# Patient Record
Sex: FEMALE | URBAN - METROPOLITAN AREA
[De-identification: names, ages, dates, MRNs, and addresses within clinical notes are randomized per-mention and may not be internally consistent; named-entity substitution may affect disease eponyms.]

---

## 2018-01-12 ENCOUNTER — APPOINTMENT (RX ONLY)
Dept: URBAN - METROPOLITAN AREA CLINIC 158 | Facility: CLINIC | Age: 33
Setting detail: DERMATOLOGY
End: 2018-01-12

## 2018-01-12 DIAGNOSIS — L72.8 OTHER FOLLICULAR CYSTS OF THE SKIN AND SUBCUTANEOUS TISSUE: ICD-10-CM

## 2018-01-12 DIAGNOSIS — B07.8 OTHER VIRAL WARTS: ICD-10-CM

## 2018-01-12 PROBLEM — L70.0 ACNE VULGARIS: Status: ACTIVE | Noted: 2018-01-12

## 2018-01-12 PROCEDURE — 17110 DESTRUCTION B9 LES UP TO 14: CPT

## 2018-01-12 PROCEDURE — 99242 OFF/OP CONSLTJ NEW/EST SF 20: CPT | Mod: 25

## 2018-01-12 PROCEDURE — ? LIQUID NITROGEN

## 2018-01-12 PROCEDURE — ? TREATMENT REGIMEN

## 2018-01-12 PROCEDURE — ? COUNSELING

## 2018-01-12 PROCEDURE — ? PRESCRIPTION

## 2018-01-12 RX ORDER — TAZAROTENE 1 MG/G
APPLY CREAM CUTANEOUS QHS
Qty: 30 | Refills: 5 | Status: ERX | COMMUNITY
Start: 2018-01-12

## 2018-01-12 RX ADMIN — TAZAROTENE APPLY: 1 CREAM CUTANEOUS at 13:34

## 2018-01-12 ASSESSMENT — LOCATION ZONE DERM
LOCATION ZONE: FACE
LOCATION ZONE: FINGER

## 2018-01-12 ASSESSMENT — LOCATION SIMPLE DESCRIPTION DERM
LOCATION SIMPLE: LEFT FOREHEAD
LOCATION SIMPLE: LEFT THUMB
LOCATION SIMPLE: CHIN
LOCATION SIMPLE: RIGHT CHEEK

## 2018-01-12 ASSESSMENT — LOCATION DETAILED DESCRIPTION DERM
LOCATION DETAILED: RIGHT CHIN
LOCATION DETAILED: PERIUNGUAL SKIN LEFT THUMB
LOCATION DETAILED: LEFT INFERIOR MEDIAL FOREHEAD
LOCATION DETAILED: RIGHT MEDIAL BUCCAL CHEEK

## 2018-01-12 NOTE — PROCEDURE: LIQUID NITROGEN
Medical Necessity Information: It is in your best interest to select a reason for this procedure from the list below. All of these items fulfill various CMS LCD requirements except the new and changing color options.
Number Of Freeze-Thaw Cycles: 2 freeze-thaw cycles
Consent: The patient's consent was obtained including but not limited to risks of crusting, scabbing, blistering, scarring, darker or lighter pigmentary change, recurrence, incomplete removal and infection.
Add 52 Modifier (Optional): no
Post-Care Instructions: I reviewed with the patient in detail post-care instructions. Patient is to wear sunprotection, and avoid picking at any of the treated lesions. Pt may apply Vaseline to crusted or scabbing areas.
Render Post-Care Instructions In Note?: yes
Medical Necessity Clause: This procedure was medically necessary because the lesions that were treated were:
Detail Level: Detailed

## 2018-01-12 NOTE — PROCEDURE: TREATMENT REGIMEN
Detail Level: Zone
Samples Given: Tazorac copay card
Plan: Discussed ILK injections if not improved with Tazorac 0.1% cream
Plan: Discussed imiquimod cream cream if wart doesnt clear with Ln2 tx
Initiate Treatment: Tazorac 0.1% cream at bedtime

## 2021-06-21 ENCOUNTER — OFFICE VISIT (OUTPATIENT)
Dept: FAMILY MEDICINE | Facility: CLINIC | Age: 36
End: 2021-06-21
Payer: OTHER GOVERNMENT

## 2021-06-21 ENCOUNTER — LAB VISIT (OUTPATIENT)
Dept: LAB | Facility: HOSPITAL | Age: 36
End: 2021-06-21
Attending: INTERNAL MEDICINE
Payer: OTHER GOVERNMENT

## 2021-06-21 VITALS
DIASTOLIC BLOOD PRESSURE: 80 MMHG | RESPIRATION RATE: 16 BRPM | OXYGEN SATURATION: 97 % | HEIGHT: 66 IN | TEMPERATURE: 98 F | BODY MASS INDEX: 25.47 KG/M2 | SYSTOLIC BLOOD PRESSURE: 118 MMHG | WEIGHT: 158.5 LBS | HEART RATE: 79 BPM

## 2021-06-21 DIAGNOSIS — Z87.442 HISTORY OF KIDNEY STONES: ICD-10-CM

## 2021-06-21 DIAGNOSIS — Z86.19 H/O HERPES LABIALIS: ICD-10-CM

## 2021-06-21 DIAGNOSIS — Z00.00 ENCOUNTER FOR ANNUAL PHYSICAL EXAM: Primary | ICD-10-CM

## 2021-06-21 DIAGNOSIS — Z00.00 ENCOUNTER FOR ANNUAL PHYSICAL EXAM: ICD-10-CM

## 2021-06-21 PROBLEM — N20.0 KIDNEY STONES: Status: ACTIVE | Noted: 2021-06-21

## 2021-06-21 LAB
ALBUMIN SERPL BCP-MCNC: 3.5 G/DL (ref 3.5–5.2)
ALP SERPL-CCNC: 32 U/L (ref 55–135)
ALT SERPL W/O P-5'-P-CCNC: 16 U/L (ref 10–44)
ANION GAP SERPL CALC-SCNC: 8 MMOL/L (ref 8–16)
AST SERPL-CCNC: 18 U/L (ref 10–40)
BASOPHILS # BLD AUTO: 0.03 K/UL (ref 0–0.2)
BASOPHILS NFR BLD: 0.4 % (ref 0–1.9)
BILIRUB SERPL-MCNC: 0.4 MG/DL (ref 0.1–1)
BUN SERPL-MCNC: 8 MG/DL (ref 6–20)
CALCIUM SERPL-MCNC: 9.8 MG/DL (ref 8.7–10.5)
CHLORIDE SERPL-SCNC: 108 MMOL/L (ref 95–110)
CHOLEST SERPL-MCNC: 181 MG/DL (ref 120–199)
CHOLEST/HDLC SERPL: 3.9 {RATIO} (ref 2–5)
CO2 SERPL-SCNC: 24 MMOL/L (ref 23–29)
CREAT SERPL-MCNC: 0.8 MG/DL (ref 0.5–1.4)
DIFFERENTIAL METHOD: NORMAL
EOSINOPHIL # BLD AUTO: 0.1 K/UL (ref 0–0.5)
EOSINOPHIL NFR BLD: 0.9 % (ref 0–8)
ERYTHROCYTE [DISTWIDTH] IN BLOOD BY AUTOMATED COUNT: 14.3 % (ref 11.5–14.5)
EST. GFR  (AFRICAN AMERICAN): >60 ML/MIN/1.73 M^2
EST. GFR  (NON AFRICAN AMERICAN): >60 ML/MIN/1.73 M^2
ESTIMATED AVG GLUCOSE: 100 MG/DL (ref 68–131)
GLUCOSE SERPL-MCNC: 86 MG/DL (ref 70–110)
HBA1C MFR BLD: 5.1 % (ref 4–5.6)
HCT VFR BLD AUTO: 37.4 % (ref 37–48.5)
HDLC SERPL-MCNC: 47 MG/DL (ref 40–75)
HDLC SERPL: 26 % (ref 20–50)
HGB BLD-MCNC: 12 G/DL (ref 12–16)
IMM GRANULOCYTES # BLD AUTO: 0.01 K/UL (ref 0–0.04)
IMM GRANULOCYTES NFR BLD AUTO: 0.1 % (ref 0–0.5)
LDLC SERPL CALC-MCNC: 114.8 MG/DL (ref 63–159)
LYMPHOCYTES # BLD AUTO: 2 K/UL (ref 1–4.8)
LYMPHOCYTES NFR BLD: 30 % (ref 18–48)
MCH RBC QN AUTO: 27.5 PG (ref 27–31)
MCHC RBC AUTO-ENTMCNC: 32.1 G/DL (ref 32–36)
MCV RBC AUTO: 86 FL (ref 82–98)
MONOCYTES # BLD AUTO: 0.5 K/UL (ref 0.3–1)
MONOCYTES NFR BLD: 7.8 % (ref 4–15)
NEUTROPHILS # BLD AUTO: 4.1 K/UL (ref 1.8–7.7)
NEUTROPHILS NFR BLD: 60.8 % (ref 38–73)
NONHDLC SERPL-MCNC: 134 MG/DL
NRBC BLD-RTO: 0 /100 WBC
PLATELET # BLD AUTO: 364 K/UL (ref 150–450)
PMV BLD AUTO: 11.3 FL (ref 9.2–12.9)
POTASSIUM SERPL-SCNC: 5 MMOL/L (ref 3.5–5.1)
PROT SERPL-MCNC: 7.3 G/DL (ref 6–8.4)
RBC # BLD AUTO: 4.37 M/UL (ref 4–5.4)
SODIUM SERPL-SCNC: 140 MMOL/L (ref 136–145)
TRIGL SERPL-MCNC: 96 MG/DL (ref 30–150)
TSH SERPL DL<=0.005 MIU/L-ACNC: 1.43 UIU/ML (ref 0.4–4)
WBC # BLD AUTO: 6.76 K/UL (ref 3.9–12.7)

## 2021-06-21 PROCEDURE — 99385 PR PREVENTIVE VISIT,NEW,18-39: ICD-10-PCS | Mod: S$PBB,,, | Performed by: INTERNAL MEDICINE

## 2021-06-21 PROCEDURE — 85025 COMPLETE CBC W/AUTO DIFF WBC: CPT | Performed by: INTERNAL MEDICINE

## 2021-06-21 PROCEDURE — 83036 HEMOGLOBIN GLYCOSYLATED A1C: CPT | Performed by: INTERNAL MEDICINE

## 2021-06-21 PROCEDURE — 80053 COMPREHEN METABOLIC PANEL: CPT | Performed by: INTERNAL MEDICINE

## 2021-06-21 PROCEDURE — 99205 OFFICE O/P NEW HI 60 MIN: CPT | Mod: PBBFAC,PO | Performed by: INTERNAL MEDICINE

## 2021-06-21 PROCEDURE — 99999 PR PBB SHADOW E&M-NEW PATIENT-LVL V: CPT | Mod: PBBFAC,,, | Performed by: INTERNAL MEDICINE

## 2021-06-21 PROCEDURE — 36415 COLL VENOUS BLD VENIPUNCTURE: CPT | Mod: PO | Performed by: INTERNAL MEDICINE

## 2021-06-21 PROCEDURE — 80061 LIPID PANEL: CPT | Performed by: INTERNAL MEDICINE

## 2021-06-21 PROCEDURE — 84443 ASSAY THYROID STIM HORMONE: CPT | Performed by: INTERNAL MEDICINE

## 2021-06-21 PROCEDURE — 99999 PR PBB SHADOW E&M-NEW PATIENT-LVL V: ICD-10-PCS | Mod: PBBFAC,,, | Performed by: INTERNAL MEDICINE

## 2021-06-21 PROCEDURE — 99385 PREV VISIT NEW AGE 18-39: CPT | Mod: S$PBB,,, | Performed by: INTERNAL MEDICINE

## 2021-06-21 RX ORDER — NORGESTIMATE AND ETHINYL ESTRADIOL 7DAYSX3 LO
1 KIT ORAL DAILY
COMMUNITY
Start: 2021-06-07 | End: 2021-08-16 | Stop reason: SDUPTHER

## 2021-06-21 RX ORDER — POTASSIUM CITRATE 15 MEQ/1
TABLET, EXTENDED RELEASE ORAL DAILY
COMMUNITY
End: 2021-06-25 | Stop reason: SDUPTHER

## 2021-06-21 RX ORDER — VALACYCLOVIR HYDROCHLORIDE 500 MG/1
TABLET, FILM COATED ORAL
COMMUNITY
End: 2021-10-04 | Stop reason: SDUPTHER

## 2021-06-21 RX ORDER — NORGESTIMATE AND ETHINYL ESTRADIOL 7DAYSX3 LO
KIT ORAL DAILY
COMMUNITY
End: 2021-08-16

## 2021-06-23 ENCOUNTER — TELEPHONE (OUTPATIENT)
Dept: FAMILY MEDICINE | Facility: CLINIC | Age: 36
End: 2021-06-23

## 2021-06-25 ENCOUNTER — OFFICE VISIT (OUTPATIENT)
Dept: UROLOGY | Facility: CLINIC | Age: 36
End: 2021-06-25
Payer: OTHER GOVERNMENT

## 2021-06-25 VITALS — HEIGHT: 66 IN | BODY MASS INDEX: 25.38 KG/M2 | WEIGHT: 157.94 LBS

## 2021-06-25 DIAGNOSIS — Z87.442 HISTORY OF KIDNEY STONES: ICD-10-CM

## 2021-06-25 DIAGNOSIS — N39.3 SUI (STRESS URINARY INCONTINENCE, FEMALE): Primary | ICD-10-CM

## 2021-06-25 DIAGNOSIS — N22 CALCULUS OF URINARY TRACT IN DISEASES CLASSIFIED ELSEWHERE: ICD-10-CM

## 2021-06-25 PROCEDURE — 99214 OFFICE O/P EST MOD 30 MIN: CPT | Mod: PBBFAC | Performed by: STUDENT IN AN ORGANIZED HEALTH CARE EDUCATION/TRAINING PROGRAM

## 2021-06-25 PROCEDURE — 99999 PR PBB SHADOW E&M-EST. PATIENT-LVL IV: ICD-10-PCS | Mod: PBBFAC,,, | Performed by: STUDENT IN AN ORGANIZED HEALTH CARE EDUCATION/TRAINING PROGRAM

## 2021-06-25 PROCEDURE — 99243 OFF/OP CNSLTJ NEW/EST LOW 30: CPT | Mod: S$PBB,,, | Performed by: STUDENT IN AN ORGANIZED HEALTH CARE EDUCATION/TRAINING PROGRAM

## 2021-06-25 PROCEDURE — 99243 PR OFFICE CONSULTATION,LEVEL III: ICD-10-PCS | Mod: S$PBB,,, | Performed by: STUDENT IN AN ORGANIZED HEALTH CARE EDUCATION/TRAINING PROGRAM

## 2021-06-25 PROCEDURE — 99999 PR PBB SHADOW E&M-EST. PATIENT-LVL IV: CPT | Mod: PBBFAC,,, | Performed by: STUDENT IN AN ORGANIZED HEALTH CARE EDUCATION/TRAINING PROGRAM

## 2021-06-25 RX ORDER — POTASSIUM CITRATE 15 MEQ/1
1 TABLET, EXTENDED RELEASE ORAL DAILY
Qty: 356 TABLET | Refills: 1 | Status: SHIPPED | OUTPATIENT
Start: 2021-06-25 | End: 2022-09-21 | Stop reason: SDUPTHER

## 2021-07-06 ENCOUNTER — PATIENT MESSAGE (OUTPATIENT)
Dept: ADMINISTRATIVE | Facility: HOSPITAL | Age: 36
End: 2021-07-06

## 2021-07-08 ENCOUNTER — OFFICE VISIT (OUTPATIENT)
Dept: URGENT CARE | Facility: CLINIC | Age: 36
End: 2021-07-08
Payer: OTHER GOVERNMENT

## 2021-07-08 VITALS
HEIGHT: 66 IN | DIASTOLIC BLOOD PRESSURE: 87 MMHG | WEIGHT: 157 LBS | RESPIRATION RATE: 18 BRPM | HEART RATE: 87 BPM | SYSTOLIC BLOOD PRESSURE: 120 MMHG | BODY MASS INDEX: 25.23 KG/M2 | OXYGEN SATURATION: 98 % | TEMPERATURE: 99 F

## 2021-07-08 DIAGNOSIS — B96.89 ACUTE BACTERIAL SINUSITIS: Primary | ICD-10-CM

## 2021-07-08 DIAGNOSIS — R05.9 COUGH: ICD-10-CM

## 2021-07-08 DIAGNOSIS — J01.90 ACUTE BACTERIAL SINUSITIS: Primary | ICD-10-CM

## 2021-07-08 LAB
CTP QC/QA: YES
SARS-COV-2 RDRP RESP QL NAA+PROBE: NEGATIVE

## 2021-07-08 PROCEDURE — U0002 COVID-19 LAB TEST NON-CDC: HCPCS | Mod: QW,S$GLB,, | Performed by: PHYSICIAN ASSISTANT

## 2021-07-08 PROCEDURE — 99214 PR OFFICE/OUTPT VISIT, EST, LEVL IV, 30-39 MIN: ICD-10-PCS | Mod: S$GLB,,, | Performed by: PHYSICIAN ASSISTANT

## 2021-07-08 PROCEDURE — U0002: ICD-10-PCS | Mod: QW,S$GLB,, | Performed by: PHYSICIAN ASSISTANT

## 2021-07-08 PROCEDURE — 99214 OFFICE O/P EST MOD 30 MIN: CPT | Mod: S$GLB,,, | Performed by: PHYSICIAN ASSISTANT

## 2021-07-08 RX ORDER — METHYLPREDNISOLONE 4 MG/1
TABLET ORAL
Qty: 1 PACKAGE | Refills: 0 | Status: SHIPPED | OUTPATIENT
Start: 2021-07-08 | End: 2021-08-16

## 2021-07-08 RX ORDER — BENZONATATE 100 MG/1
200 CAPSULE ORAL 3 TIMES DAILY PRN
Qty: 40 CAPSULE | Refills: 0 | Status: SHIPPED | OUTPATIENT
Start: 2021-07-08 | End: 2021-07-15

## 2021-07-08 RX ORDER — AMOXICILLIN AND CLAVULANATE POTASSIUM 875; 125 MG/1; MG/1
1 TABLET, FILM COATED ORAL 2 TIMES DAILY
Qty: 20 TABLET | Refills: 0 | Status: SHIPPED | OUTPATIENT
Start: 2021-07-08 | End: 2021-07-18

## 2021-07-09 ENCOUNTER — HOSPITAL ENCOUNTER (OUTPATIENT)
Dept: RADIOLOGY | Facility: HOSPITAL | Age: 36
Discharge: HOME | End: 2021-07-09
Attending: STUDENT IN AN ORGANIZED HEALTH CARE EDUCATION/TRAINING PROGRAM
Payer: OTHER GOVERNMENT

## 2021-07-09 DIAGNOSIS — N22 CALCULUS OF URINARY TRACT IN DISEASES CLASSIFIED ELSEWHERE: ICD-10-CM

## 2021-07-09 DIAGNOSIS — Z87.442 HISTORY OF KIDNEY STONES: ICD-10-CM

## 2021-07-09 PROCEDURE — 74176 CT ABD & PELVIS W/O CONTRAST: CPT | Mod: 26,,, | Performed by: RADIOLOGY

## 2021-07-09 PROCEDURE — 74176 CT ABD & PELVIS W/O CONTRAST: CPT | Mod: TC

## 2021-07-09 PROCEDURE — 74176 CT RENAL STONE STUDY ABD PELVIS WO: ICD-10-PCS | Mod: 26,,, | Performed by: RADIOLOGY

## 2021-07-12 ENCOUNTER — TELEPHONE (OUTPATIENT)
Dept: UROLOGY | Facility: CLINIC | Age: 36
End: 2021-07-12

## 2021-08-16 ENCOUNTER — OFFICE VISIT (OUTPATIENT)
Dept: OBSTETRICS AND GYNECOLOGY | Facility: CLINIC | Age: 36
End: 2021-08-16
Attending: OBSTETRICS & GYNECOLOGY
Payer: OTHER GOVERNMENT

## 2021-08-16 VITALS
BODY MASS INDEX: 23.91 KG/M2 | SYSTOLIC BLOOD PRESSURE: 120 MMHG | WEIGHT: 148.13 LBS | DIASTOLIC BLOOD PRESSURE: 60 MMHG

## 2021-08-16 DIAGNOSIS — Z30.41 ENCOUNTER FOR SURVEILLANCE OF CONTRACEPTIVE PILLS: Primary | ICD-10-CM

## 2021-08-16 DIAGNOSIS — K59.00 CONSTIPATION, UNSPECIFIED CONSTIPATION TYPE: ICD-10-CM

## 2021-08-16 PROCEDURE — 99202 PR OFFICE/OUTPT VISIT, NEW, LEVL II, 15-29 MIN: ICD-10-PCS | Mod: S$PBB,,, | Performed by: OBSTETRICS & GYNECOLOGY

## 2021-08-16 PROCEDURE — 99999 PR PBB SHADOW E&M-EST. PATIENT-LVL II: ICD-10-PCS | Mod: PBBFAC,,, | Performed by: OBSTETRICS & GYNECOLOGY

## 2021-08-16 PROCEDURE — 99212 OFFICE O/P EST SF 10 MIN: CPT | Mod: PBBFAC | Performed by: OBSTETRICS & GYNECOLOGY

## 2021-08-16 PROCEDURE — 99202 OFFICE O/P NEW SF 15 MIN: CPT | Mod: S$PBB,,, | Performed by: OBSTETRICS & GYNECOLOGY

## 2021-08-16 PROCEDURE — 99999 PR PBB SHADOW E&M-EST. PATIENT-LVL II: CPT | Mod: PBBFAC,,, | Performed by: OBSTETRICS & GYNECOLOGY

## 2021-08-16 RX ORDER — NORGESTIMATE AND ETHINYL ESTRADIOL 7DAYSX3 LO
1 KIT ORAL DAILY
Qty: 90 TABLET | Refills: 4 | Status: SHIPPED | OUTPATIENT
Start: 2021-08-16 | End: 2022-08-30

## 2021-10-04 ENCOUNTER — PATIENT MESSAGE (OUTPATIENT)
Dept: ADMINISTRATIVE | Facility: HOSPITAL | Age: 36
End: 2021-10-04

## 2021-10-04 ENCOUNTER — PATIENT MESSAGE (OUTPATIENT)
Dept: FAMILY MEDICINE | Facility: CLINIC | Age: 36
End: 2021-10-04

## 2021-10-04 RX ORDER — VALACYCLOVIR HYDROCHLORIDE 500 MG/1
TABLET, FILM COATED ORAL
Qty: 16 TABLET | Refills: 1 | Status: SHIPPED | OUTPATIENT
Start: 2021-10-04 | End: 2022-02-17 | Stop reason: SDUPTHER

## 2022-02-17 ENCOUNTER — TELEPHONE (OUTPATIENT)
Dept: FAMILY MEDICINE | Facility: CLINIC | Age: 37
End: 2022-02-17
Payer: OTHER GOVERNMENT

## 2022-02-17 ENCOUNTER — PATIENT MESSAGE (OUTPATIENT)
Dept: FAMILY MEDICINE | Facility: CLINIC | Age: 37
End: 2022-02-17
Payer: OTHER GOVERNMENT

## 2022-02-17 DIAGNOSIS — Z86.19 H/O HERPES LABIALIS: Primary | ICD-10-CM

## 2022-02-17 RX ORDER — VALACYCLOVIR HYDROCHLORIDE 500 MG/1
TABLET, FILM COATED ORAL
Qty: 16 TABLET | Refills: 1 | Status: SHIPPED | OUTPATIENT
Start: 2022-02-17 | End: 2022-10-06 | Stop reason: SDUPTHER

## 2022-02-17 NOTE — TELEPHONE ENCOUNTER
Left message for pt to return call to clinic to see if it okay to send prescription without having the virtual visit.

## 2022-05-16 ENCOUNTER — TELEPHONE (OUTPATIENT)
Dept: FAMILY MEDICINE | Facility: CLINIC | Age: 37
End: 2022-05-16
Payer: OTHER GOVERNMENT

## 2022-05-16 NOTE — TELEPHONE ENCOUNTER
----- Message from Emily Crystal sent at 5/16/2022  9:52 AM CDT -----  Regarding: self  .Type:  Needs Medical Advice    Who Called:  self   Symptoms (please be specific):  rash on face under nose, burning   How long has patient had these symptoms:   a while   Pharmacy name and phone #:       [facility] - [place], [place] - [address]  [address] [facility] [address] [address]  Phone: [phone] Fax: [phone]      Would the patient rather a call back or a response via [facility]? Call   Best Call Back Number: .[phone]

## 2022-05-17 ENCOUNTER — OFFICE VISIT (OUTPATIENT)
Dept: FAMILY MEDICINE | Facility: CLINIC | Age: 37
End: 2022-05-17
Payer: OTHER GOVERNMENT

## 2022-05-17 VITALS
TEMPERATURE: 99 F | HEART RATE: 73 BPM | SYSTOLIC BLOOD PRESSURE: 100 MMHG | OXYGEN SATURATION: 99 % | DIASTOLIC BLOOD PRESSURE: 86 MMHG | HEIGHT: 66 IN | WEIGHT: 133.5 LBS | BODY MASS INDEX: 21.45 KG/M2

## 2022-05-17 DIAGNOSIS — R21 RASH: Primary | ICD-10-CM

## 2022-05-17 PROCEDURE — 99999 PR PBB SHADOW E&M-EST. PATIENT-LVL IV: ICD-10-PCS | Mod: PBBFAC,,, | Performed by: NURSE PRACTITIONER

## 2022-05-17 PROCEDURE — 99213 OFFICE O/P EST LOW 20 MIN: CPT | Mod: S$PBB,,, | Performed by: NURSE PRACTITIONER

## 2022-05-17 PROCEDURE — 99213 PR OFFICE/OUTPT VISIT, EST, LEVL III, 20-29 MIN: ICD-10-PCS | Mod: S$PBB,,, | Performed by: NURSE PRACTITIONER

## 2022-05-17 PROCEDURE — 99214 OFFICE O/P EST MOD 30 MIN: CPT | Mod: PBBFAC,PO | Performed by: NURSE PRACTITIONER

## 2022-05-17 PROCEDURE — 99999 PR PBB SHADOW E&M-EST. PATIENT-LVL IV: CPT | Mod: PBBFAC,,, | Performed by: NURSE PRACTITIONER

## 2022-05-17 RX ORDER — TRIAMCINOLONE ACETONIDE 1 MG/G
CREAM TOPICAL 2 TIMES DAILY
Qty: 15 G | Refills: 0 | Status: SHIPPED | OUTPATIENT
Start: 2022-05-17 | End: 2022-09-21

## 2022-05-17 NOTE — PROGRESS NOTES
Chief Complaint  Chief Complaint   Patient presents with    Rash     Under nose, off and on since January. Goes away and flares back up. Rash burns, if nose is running and mucus touches it it hurts really bad.       HPI    HPI   Ms. Addis Cristobal is a 37 y.o. female with medical problems as listed below. The patient presents to clinic with c/o rash under her nose that comes and goes. The patient states that she has been having this problem since january. The patient states that the rash burns and gets worse when her nose runs.    The patient states that she has tried hydrocortisone, antifungal, Vaseline and has even taking valtrex and Abreva with no relief.     PAST MEDICAL HISTORY:  Past Medical History:   Diagnosis Date    Human papilloma virus (HPV) type 9 vaccine administered     Kidney stones        PAST SURGICAL HISTORY:  Past Surgical History:   Procedure Laterality Date     SECTION         SOCIAL HISTORY:  Social History     Socioeconomic History    Marital status:    Occupational History    Occupation: [employer]     Comment: planner, works at [employer]   Tobacco Use    Smoking status: Never Smoker    Smokeless tobacco: Never Used   Substance and Sexual Activity    Alcohol use: Yes     Alcohol/week: 1.0 standard drink     Types: 1 Glasses of wine per week     Comment: socially    Drug use: Never    Sexual activity: Yes     Partners: Male     Birth control/protection: OCP   Social History Narrative    [employer] son 7 months.    [employer].    Dairy allergy.       FAMILY HISTORY:  Family History   Problem Relation Age of Onset    Hypertension Mother     Coronary artery disease Mother         stent    Kidney disease Father         kidney stones    No Known Problems Sister     Breast cancer Neg Hx     Ovarian cancer Neg Hx     Colon cancer Neg Hx        ALLERGIES AND MEDICATIONS: updated and reviewed.  Review of patient's allergies indicates:   Allergen Reactions  "   Doxycycline Anaphylaxis     esopahgeal spasms     Current Outpatient Medications   Medication Sig Dispense Refill    potassium citrate (UROCIT-K 15) 15 mEq TbSR Use as directed 1 tablet in the mouth or throat once daily. 356 tablet 1    TRI-LO-SPRINTEC 0.18/0.215/0.25 mg-25 mcg tablet Take 1 tablet by mouth once daily. 90 tablet 4    valACYclovir (VALTREX) 500 MG tablet Take 2 pills twice a day if flare 16 tablet 1    triamcinolone acetonide 0.1% (KENALOG) 0.1 % cream Apply topically 2 (two) times daily. 15 g 0     No current facility-administered medications for this visit.       Patient Care Team:  Leigh Zepeda MD as PCP - General (Family Medicine)    ROS  Review of Systems   Constitutional: Negative for chills, fatigue, fever and unexpected weight change.   HENT: Negative for congestion, ear discharge, ear pain and postnasal drip.    Eyes: Negative for photophobia, pain and visual disturbance.   Respiratory: Negative for cough, shortness of breath and wheezing.    Cardiovascular: Negative for chest pain, palpitations and leg swelling.   Gastrointestinal: Negative for abdominal pain, constipation, diarrhea, nausea and vomiting.   Genitourinary: Negative for dysuria, frequency, urgency and vaginal discharge.   Musculoskeletal: Negative for back pain, joint swelling and neck stiffness.   Skin: Positive for rash.   Neurological: Negative for weakness and headaches.   Psychiatric/Behavioral: Negative for dysphoric mood and sleep disturbance. The patient is not nervous/anxious.            Physical Exam  Vitals:    05/17/22 0813   BP: 100/86   Pulse: 73   Temp: 98.7 °F (37.1 °C)   TempSrc: Oral   SpO2: 99%   Weight: 60.6 kg (133 lb 7.8 oz)   Height: 5' 6" (1.676 m)    Body mass index is 21.55 kg/m².  Weight: 60.6 kg (133 lb 7.8 oz)   Height: 5' 6" (167.6 cm)     Physical Exam  Constitutional:       Appearance: She is well-developed.   HENT:      Head: Normocephalic and atraumatic.      Right Ear: External " ear normal.      Left Ear: External ear normal.      Nose: Nose normal.   Eyes:      Conjunctiva/sclera: Conjunctivae normal.      Pupils: Pupils are equal, round, and reactive to light.   Neck:      Thyroid: No thyromegaly.   Cardiovascular:      Rate and Rhythm: Normal rate.   Pulmonary:      Effort: Pulmonary effort is normal.      Breath sounds: Normal breath sounds.   Abdominal:      Palpations: There is no hepatomegaly or splenomegaly.   Genitourinary:     Vagina: Normal.   Musculoskeletal:         General: Normal range of motion.      Cervical back: Normal range of motion and neck supple.   Skin:     General: Skin is warm and dry.      Findings: Rash present. Rash is scaling (erythema noted).          Neurological:      Mental Status: She is alert and oriented to person, place, and time.      Cranial Nerves: No cranial nerve deficit.   Psychiatric:         Behavior: Behavior normal.         Thought Content: Thought content normal.         Judgment: Judgment normal.         Health Maintenance       Date Due Completion Date    Hepatitis C Screening Never done ---    Cervical Cancer Screening Never done ---    TETANUS VACCINE Never done ---    COVID-19 Vaccine (2 - Pfizer series) 01/16/2022 12/26/2021    HIV Screening 06/21/2027 (Originally 5/7/2002) ---    Influenza Vaccine (Season Ended) 09/01/2022 ---      Health maintenance reviewed at this time.     Assessment & Plan  Rash  -     Ambulatory referral/consult to Dermatology; Future; Expected date: 05/24/2022  -     triamcinolone acetonide 0.1% (KENALOG) 0.1 % cream; Apply topically 2 (two) times daily.  Dispense: 15 g; Refill: 0           Follow-up: Follow up if symptoms worsen or fail to improve.

## 2022-05-30 ENCOUNTER — PATIENT MESSAGE (OUTPATIENT)
Dept: ADMINISTRATIVE | Facility: HOSPITAL | Age: 37
End: 2022-05-30
Payer: OTHER GOVERNMENT

## 2022-08-17 ENCOUNTER — PATIENT MESSAGE (OUTPATIENT)
Dept: FAMILY MEDICINE | Facility: CLINIC | Age: 37
End: 2022-08-17
Payer: OTHER GOVERNMENT

## 2022-08-17 DIAGNOSIS — Z01.419 WELL WOMAN EXAM: ICD-10-CM

## 2022-08-17 DIAGNOSIS — N20.0 KIDNEY STONES: ICD-10-CM

## 2022-08-24 NOTE — TELEPHONE ENCOUNTER
----- Message from Maura Hope sent at 8/24/2022 11:27 AM CDT -----  .Type: Patient Call Back    Who called: self    What is the request in detail:  need a referral for urologist and obgyn for , she has messaged over a week ago and needs this. PLEASE CALL    Can the clinic reply by [facility]?no    Would the patient rather a call back or a response via [facility]? call    Best call back number:.[phone] (home)

## 2022-09-21 ENCOUNTER — OFFICE VISIT (OUTPATIENT)
Dept: UROLOGY | Facility: CLINIC | Age: 37
End: 2022-09-21
Payer: OTHER GOVERNMENT

## 2022-09-21 ENCOUNTER — OFFICE VISIT (OUTPATIENT)
Dept: OBSTETRICS AND GYNECOLOGY | Facility: CLINIC | Age: 37
End: 2022-09-21
Attending: OBSTETRICS & GYNECOLOGY
Payer: OTHER GOVERNMENT

## 2022-09-21 VITALS — BODY MASS INDEX: 21.1 KG/M2 | WEIGHT: 130.75 LBS

## 2022-09-21 VITALS — SYSTOLIC BLOOD PRESSURE: 108 MMHG | BODY MASS INDEX: 20.74 KG/M2 | DIASTOLIC BLOOD PRESSURE: 80 MMHG | WEIGHT: 128.5 LBS

## 2022-09-21 DIAGNOSIS — Z87.442 HISTORY OF KIDNEY STONES: ICD-10-CM

## 2022-09-21 DIAGNOSIS — Z01.419 WELL WOMAN EXAM: ICD-10-CM

## 2022-09-21 DIAGNOSIS — N20.0 KIDNEY STONES: ICD-10-CM

## 2022-09-21 DIAGNOSIS — Z30.41 ENCOUNTER FOR SURVEILLANCE OF CONTRACEPTIVE PILLS: Primary | ICD-10-CM

## 2022-09-21 PROCEDURE — 99213 OFFICE O/P EST LOW 20 MIN: CPT | Mod: PBBFAC | Performed by: OBSTETRICS & GYNECOLOGY

## 2022-09-21 PROCEDURE — 99999 PR PBB SHADOW E&M-EST. PATIENT-LVL III: CPT | Mod: PBBFAC,,, | Performed by: OBSTETRICS & GYNECOLOGY

## 2022-09-21 PROCEDURE — 99395 PREV VISIT EST AGE 18-39: CPT | Mod: S$PBB,,, | Performed by: OBSTETRICS & GYNECOLOGY

## 2022-09-21 PROCEDURE — 99999 PR PBB SHADOW E&M-EST. PATIENT-LVL III: ICD-10-PCS | Mod: PBBFAC,,, | Performed by: OBSTETRICS & GYNECOLOGY

## 2022-09-21 PROCEDURE — 99999 PR PBB SHADOW E&M-EST. PATIENT-LVL IV: CPT | Mod: PBBFAC,,, | Performed by: STUDENT IN AN ORGANIZED HEALTH CARE EDUCATION/TRAINING PROGRAM

## 2022-09-21 PROCEDURE — 99214 OFFICE O/P EST MOD 30 MIN: CPT | Mod: PBBFAC,27 | Performed by: STUDENT IN AN ORGANIZED HEALTH CARE EDUCATION/TRAINING PROGRAM

## 2022-09-21 PROCEDURE — 99395 PR PREVENTIVE VISIT,EST,18-39: ICD-10-PCS | Mod: S$PBB,,, | Performed by: OBSTETRICS & GYNECOLOGY

## 2022-09-21 PROCEDURE — 99213 PR OFFICE/OUTPT VISIT, EST, LEVL III, 20-29 MIN: ICD-10-PCS | Mod: S$PBB,,, | Performed by: STUDENT IN AN ORGANIZED HEALTH CARE EDUCATION/TRAINING PROGRAM

## 2022-09-21 PROCEDURE — 99213 OFFICE O/P EST LOW 20 MIN: CPT | Mod: S$PBB,,, | Performed by: STUDENT IN AN ORGANIZED HEALTH CARE EDUCATION/TRAINING PROGRAM

## 2022-09-21 PROCEDURE — 99999 PR PBB SHADOW E&M-EST. PATIENT-LVL IV: ICD-10-PCS | Mod: PBBFAC,,, | Performed by: STUDENT IN AN ORGANIZED HEALTH CARE EDUCATION/TRAINING PROGRAM

## 2022-09-21 RX ORDER — POTASSIUM CITRATE 15 MEQ/1
1 TABLET, EXTENDED RELEASE ORAL DAILY
Qty: 356 TABLET | Refills: 1 | Status: SHIPPED | OUTPATIENT
Start: 2022-09-21 | End: 2023-08-21

## 2022-09-21 RX ORDER — NORGESTIMATE AND ETHINYL ESTRADIOL 7DAYSX3 LO
1 KIT ORAL DAILY
Qty: 90 TABLET | Refills: 3 | Status: SHIPPED | OUTPATIENT
Start: 2022-09-21 | End: 2023-08-21

## 2022-09-21 RX ORDER — PIMECROLIMUS 10 MG/G
CREAM TOPICAL
COMMUNITY
Start: 2022-09-19 | End: 2022-09-21

## 2022-09-21 NOTE — PROGRESS NOTES
Patient ID: Bety Cristobal is a 37 y.o. female.    Chief Complaint: Annual Exam      HPI- interval    37 y.o. who presents to the Urology clinic for f/u of nephrolithiasis. Last seen 1 year ago. Patient notes needing refill of urocitrate. Patient denies fevers, chills, nausea, vomiting, flank pain, dysuria. Drinks 64 oz of water daily. Ocassional glass of juice, 2 cups of coffee daily. Has 1 BM daily, firm, uses colace as needed. No hx of recurrent UTIs. Baby Jason is doing well and thriving. Has FH of stones.       Medically Necessary ROS documented in HPI    HPI      37 yo woman w/ hx of recurrent urolithiasis since . Patient has had 5-6 stone events, passed stone. Last stone passed in 2018. Patient has not required a procedure to clear stones to date. Patient has completed 24 hr urine studies in the past which revealed low citrate excretion. Patient has been drinking 4-6 glasses of water per day.    Father and aunt w/ hx of stones. Patient drinks soda/tea occasionally. Reports daily soft BM. Denies excess Vit C/d supplementation. Denies protein  Intake. She had a baby 2020, renal US at that time showed very small stones, imaging not available for personal review. Patient without renal colic or urinary urgency, frequency. Notes mild stress incontinence with delaying voiding.     Past Medical History  Active Ambulatory Problems     Diagnosis Date Noted    Kidney stones 2021    H/O herpes labialis- oral outbreaks only 2021     Resolved Ambulatory Problems     Diagnosis Date Noted    No Resolved Ambulatory Problems     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          Past Surgical History  Past Surgical History:   Procedure Laterality Date     SECTION         Social History  Social Connections: Not on file       Medications    Current Outpatient Medications:     norgestimate-ethinyl estradioL (TRI-LO-SPRINTEC)  0.18/0.215/0.25 mg-25 mcg tablet, Take 1 tablet by mouth once daily., Disp: 90 tablet, Rfl: 3    valACYclovir (VALTREX) 500 MG tablet, Take 2 pills twice a day if flare, Disp: 16 tablet, Rfl: 1    potassium citrate (UROCIT-K 15) 15 mEq TbSR, Use as directed 1 tablet in the mouth or throat once daily., Disp: 356 tablet, Rfl: 1    Allergies  Review of patient's allergies indicates:   Allergen Reactions    Doxycycline Anaphylaxis     esopahgeal spasms       Patient's PMH, FH, Social hx, Medications, allergies reviewed and updated as pertinent to today's visit    Objective:      Physical Exam  Constitutional:       Appearance: She is well-developed.   HENT:      Head: Normocephalic and atraumatic.   Eyes:      Conjunctiva/sclera: Conjunctivae normal.   Pulmonary:      Effort: Pulmonary effort is normal. No respiratory distress.   Abdominal:      General: Abdomen is flat. There is no distension.      Palpations: Abdomen is soft. There is no mass.      Tenderness: There is no abdominal tenderness. There is no right CVA tenderness, left CVA tenderness or guarding.   Skin:     General: Skin is warm.      Findings: No rash.   Neurological:      Mental Status: She is alert and oriented to person, place, and time.   Psychiatric:         Behavior: Behavior normal.             Assessment:       1. Kidney stones    2. History of kidney stones          Plan:       Nephrolithiasis  Very small punctate stones on CT from 7/2021  Stone former's diet reviewed  Urocit Rx refilled  SUZIE/KUB ordered for surveillance    RTC 1 year or sooner if symptomatic

## 2022-09-21 NOTE — PROGRESS NOTES
CC: Well woman exam    Bety Cristobal is a 37 y.o. female  presents for well woman exam.  LMP: Patient's last menstrual period was 2022..  No issues, problems, or complaints.  Pap 3/2021. Son is turning 2.  Doing well with OCPs. Seeing urology for history of kidney stones.  Occasionally using colace as needed.      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     Kidney stones      Past Surgical History:   Procedure Laterality Date     SECTION       Social History     Socioeconomic History    Marital status:     Number of children: 1   Occupational History    Occupation: [employer]     Comment: planner, works at [employer]   Tobacco Use    Smoking status: Never    Smokeless tobacco: Never   Substance and Sexual Activity    Alcohol use: Yes     Alcohol/week: 1.0 standard drink     Types: 1 Glasses of wine per week     Comment: socially    Drug use: Never    Sexual activity: Yes     Partners: Male     Birth control/protection: OCP   Social History Narrative    [employer] son 7 months.    [employer].    Dairy allergy.     Family History   Problem Relation Age of Onset    Hypertension Mother     Coronary artery disease Mother         stent    Kidney disease Father         kidney stones    No Known Problems Sister     Breast cancer Neg Hx     Ovarian cancer Neg Hx     Colon cancer Neg Hx      OB History          1    Para   1    Term   1            AB        Living   1         SAB        IAB        Ectopic        Multiple        Live Births   1                 /80   Wt 58.3 kg (128 lb 8.5 oz)   LMP 2022   Breastfeeding No   BMI 20.74 kg/m²       ROS:  GENERAL: Denies weight gain or weight loss. Feeling well overall.   SKIN: Denies rash or lesions.   HEAD: Denies head injury or headache.   NODES: Denies enlarged lymph nodes.   CHEST: Denies chest pain or shortness of breath.   CARDIOVASCULAR: Denies  palpitations or left sided chest pain.   ABDOMEN: No abdominal pain, constipation, diarrhea, nausea, vomiting or rectal bleeding.   URINARY: No frequency, dysuria, hematuria, or burning on urination.  REPRODUCTIVE: See HPI.   BREASTS: The patient performs breast self-examination and denies pain, lumps, or nipple discharge.   HEMATOLOGIC: No easy bruisability or excessive bleeding.   MUSCULOSKELETAL: Denies joint pain or swelling.   NEUROLOGIC: Denies syncope or weakness.   PSYCHIATRIC: Denies depression, anxiety or mood swings.    PHYSICAL EXAM:  APPEARANCE: Well nourished, well developed, in no acute distress.  AFFECT: WNL, alert and oriented x 3  SKIN: No acne or hirsutism  NECK: Neck symmetric without masses or thyromegaly  NODES: No inguinal, cervical, axillary, or femoral lymph node enlargement  CHEST: Good respiratory effect  ABDOMEN: Soft.  No tenderness or masses.  No hepatosplenomegaly.  No hernias.  BREASTS: Symmetrical, no skin changes or visible lesions.  No palpable masses, nipple discharge bilaterally.  PELVIC: Normal external genitalia without lesions.  Normal hair distribution.  Adequate perineal body, normal urethral meatus.  Vagina moist and well rugated without lesions or discharge.  Cervix pink, without lesions, discharge or tenderness.  No significant cystocele or rectocele.  Bimanual exam shows uterus to be normal size, regular, mobile and nontender.  Adnexa without masses or tenderness.    EXTREMITIES: No edema.    Encounter for surveillance of contraceptive pills  -     norgestimate-ethinyl estradioL (TRI-LO-SPRINTEC) 0.18/0.215/0.25 mg-25 mcg tablet; Take 1 tablet by mouth once daily.  Dispense: 90 tablet; Refill: 3    Well woman exam  -     Ambulatory referral/consult to Obstetrics / Gynecology          Patient was counseled today on A.C.S. Pap guidelines and recommendations for yearly pelvic exams, mammograms and monthly self breast exams; to see her PCP for other health maintenance.      No follow-ups on file.

## 2022-10-05 ENCOUNTER — HOSPITAL ENCOUNTER (OUTPATIENT)
Dept: RADIOLOGY | Facility: HOSPITAL | Age: 37
Discharge: HOME | End: 2022-10-05
Attending: STUDENT IN AN ORGANIZED HEALTH CARE EDUCATION/TRAINING PROGRAM
Payer: OTHER GOVERNMENT

## 2022-10-05 DIAGNOSIS — Z87.442 HISTORY OF KIDNEY STONES: ICD-10-CM

## 2022-10-05 DIAGNOSIS — N20.0 KIDNEY STONES: ICD-10-CM

## 2022-10-05 PROCEDURE — 74018 RADEX ABDOMEN 1 VIEW: CPT | Mod: 26,,, | Performed by: RADIOLOGY

## 2022-10-05 PROCEDURE — 76770 US EXAM ABDO BACK WALL COMP: CPT | Mod: 26,,, | Performed by: RADIOLOGY

## 2022-10-05 PROCEDURE — 74018 XR ABDOMEN AP 1 VIEW: ICD-10-PCS | Mod: 26,,, | Performed by: RADIOLOGY

## 2022-10-05 PROCEDURE — 76770 US EXAM ABDO BACK WALL COMP: CPT | Mod: TC

## 2022-10-05 PROCEDURE — 76770 US RETROPERITONEAL COMPLETE: ICD-10-PCS | Mod: 26,,, | Performed by: RADIOLOGY

## 2022-10-05 PROCEDURE — 74018 RADEX ABDOMEN 1 VIEW: CPT | Mod: TC,FY

## 2022-10-06 ENCOUNTER — LAB VISIT (OUTPATIENT)
Dept: LAB | Facility: HOSPITAL | Age: 37
End: 2022-10-06
Attending: INTERNAL MEDICINE
Payer: OTHER GOVERNMENT

## 2022-10-06 ENCOUNTER — TELEPHONE (OUTPATIENT)
Dept: UROLOGY | Facility: CLINIC | Age: 37
End: 2022-10-06
Payer: OTHER GOVERNMENT

## 2022-10-06 ENCOUNTER — OFFICE VISIT (OUTPATIENT)
Dept: FAMILY MEDICINE | Facility: CLINIC | Age: 37
End: 2022-10-06
Payer: OTHER GOVERNMENT

## 2022-10-06 VITALS
TEMPERATURE: 99 F | WEIGHT: 129.63 LBS | DIASTOLIC BLOOD PRESSURE: 74 MMHG | RESPIRATION RATE: 16 BRPM | SYSTOLIC BLOOD PRESSURE: 120 MMHG | HEIGHT: 66 IN | OXYGEN SATURATION: 97 % | BODY MASS INDEX: 20.83 KG/M2 | HEART RATE: 83 BPM

## 2022-10-06 DIAGNOSIS — Z86.19 H/O HERPES LABIALIS: ICD-10-CM

## 2022-10-06 DIAGNOSIS — Z00.00 ENCOUNTER FOR ANNUAL PHYSICAL EXAM: ICD-10-CM

## 2022-10-06 DIAGNOSIS — L71.0 PERIORAL DERMATITIS: ICD-10-CM

## 2022-10-06 DIAGNOSIS — Z00.00 ENCOUNTER FOR ANNUAL PHYSICAL EXAM: Primary | ICD-10-CM

## 2022-10-06 DIAGNOSIS — Z87.442 HISTORY OF KIDNEY STONES: ICD-10-CM

## 2022-10-06 LAB
ALBUMIN SERPL BCP-MCNC: 3.7 G/DL (ref 3.5–5.2)
ALP SERPL-CCNC: 28 U/L (ref 55–135)
ALT SERPL W/O P-5'-P-CCNC: 16 U/L (ref 10–44)
ANION GAP SERPL CALC-SCNC: 11 MMOL/L (ref 8–16)
AST SERPL-CCNC: 20 U/L (ref 10–40)
BASOPHILS # BLD AUTO: 0.04 K/UL (ref 0–0.2)
BASOPHILS NFR BLD: 0.5 % (ref 0–1.9)
BILIRUB SERPL-MCNC: 0.4 MG/DL (ref 0.1–1)
BUN SERPL-MCNC: 10 MG/DL (ref 6–20)
CALCIUM SERPL-MCNC: 9.7 MG/DL (ref 8.7–10.5)
CHLORIDE SERPL-SCNC: 105 MMOL/L (ref 95–110)
CHOLEST SERPL-MCNC: 160 MG/DL (ref 120–199)
CHOLEST/HDLC SERPL: 2.8 {RATIO} (ref 2–5)
CO2 SERPL-SCNC: 22 MMOL/L (ref 23–29)
CREAT SERPL-MCNC: 0.8 MG/DL (ref 0.5–1.4)
DIFFERENTIAL METHOD: ABNORMAL
EOSINOPHIL # BLD AUTO: 0.1 K/UL (ref 0–0.5)
EOSINOPHIL NFR BLD: 1 % (ref 0–8)
ERYTHROCYTE [DISTWIDTH] IN BLOOD BY AUTOMATED COUNT: 13.3 % (ref 11.5–14.5)
EST. GFR  (NO RACE VARIABLE): >60 ML/MIN/1.73 M^2
ESTIMATED AVG GLUCOSE: 97 MG/DL (ref 68–131)
GLUCOSE SERPL-MCNC: 75 MG/DL (ref 70–110)
HBA1C MFR BLD: 5 % (ref 4–5.6)
HCT VFR BLD AUTO: 40.7 % (ref 37–48.5)
HCV AB SERPL QL IA: NORMAL
HDLC SERPL-MCNC: 57 MG/DL (ref 40–75)
HDLC SERPL: 35.6 % (ref 20–50)
HGB BLD-MCNC: 12.9 G/DL (ref 12–16)
IMM GRANULOCYTES # BLD AUTO: 0.02 K/UL (ref 0–0.04)
IMM GRANULOCYTES NFR BLD AUTO: 0.2 % (ref 0–0.5)
LDLC SERPL CALC-MCNC: 76.8 MG/DL (ref 63–159)
LYMPHOCYTES # BLD AUTO: 2.4 K/UL (ref 1–4.8)
LYMPHOCYTES NFR BLD: 28.8 % (ref 18–48)
MCH RBC QN AUTO: 28.7 PG (ref 27–31)
MCHC RBC AUTO-ENTMCNC: 31.7 G/DL (ref 32–36)
MCV RBC AUTO: 91 FL (ref 82–98)
MONOCYTES # BLD AUTO: 0.4 K/UL (ref 0.3–1)
MONOCYTES NFR BLD: 5.3 % (ref 4–15)
NEUTROPHILS # BLD AUTO: 5.3 K/UL (ref 1.8–7.7)
NEUTROPHILS NFR BLD: 64.2 % (ref 38–73)
NONHDLC SERPL-MCNC: 103 MG/DL
NRBC BLD-RTO: 0 /100 WBC
PLATELET # BLD AUTO: 290 K/UL (ref 150–450)
PMV BLD AUTO: 11.1 FL (ref 9.2–12.9)
POTASSIUM SERPL-SCNC: 4.4 MMOL/L (ref 3.5–5.1)
PROT SERPL-MCNC: 7 G/DL (ref 6–8.4)
RBC # BLD AUTO: 4.49 M/UL (ref 4–5.4)
SODIUM SERPL-SCNC: 138 MMOL/L (ref 136–145)
TRIGL SERPL-MCNC: 131 MG/DL (ref 30–150)
TSH SERPL DL<=0.005 MIU/L-ACNC: 1.23 UIU/ML (ref 0.4–4)
WBC # BLD AUTO: 8.23 K/UL (ref 3.9–12.7)

## 2022-10-06 PROCEDURE — 80061 LIPID PANEL: CPT | Performed by: INTERNAL MEDICINE

## 2022-10-06 PROCEDURE — 83036 HEMOGLOBIN GLYCOSYLATED A1C: CPT | Performed by: INTERNAL MEDICINE

## 2022-10-06 PROCEDURE — 99395 PR PREVENTIVE VISIT,EST,18-39: ICD-10-PCS | Mod: S$PBB,,, | Performed by: INTERNAL MEDICINE

## 2022-10-06 PROCEDURE — 86803 HEPATITIS C AB TEST: CPT | Performed by: INTERNAL MEDICINE

## 2022-10-06 PROCEDURE — 99999 PR PBB SHADOW E&M-EST. PATIENT-LVL III: ICD-10-PCS | Mod: PBBFAC,,, | Performed by: INTERNAL MEDICINE

## 2022-10-06 PROCEDURE — 99999 PR PBB SHADOW E&M-EST. PATIENT-LVL III: CPT | Mod: PBBFAC,,, | Performed by: INTERNAL MEDICINE

## 2022-10-06 PROCEDURE — 85025 COMPLETE CBC W/AUTO DIFF WBC: CPT | Performed by: INTERNAL MEDICINE

## 2022-10-06 PROCEDURE — 84443 ASSAY THYROID STIM HORMONE: CPT | Performed by: INTERNAL MEDICINE

## 2022-10-06 PROCEDURE — 99395 PREV VISIT EST AGE 18-39: CPT | Mod: S$PBB,,, | Performed by: INTERNAL MEDICINE

## 2022-10-06 PROCEDURE — 80053 COMPREHEN METABOLIC PANEL: CPT | Performed by: INTERNAL MEDICINE

## 2022-10-06 PROCEDURE — 99213 OFFICE O/P EST LOW 20 MIN: CPT | Mod: PBBFAC,PO | Performed by: INTERNAL MEDICINE

## 2022-10-06 PROCEDURE — 36415 COLL VENOUS BLD VENIPUNCTURE: CPT | Mod: PO | Performed by: INTERNAL MEDICINE

## 2022-10-06 RX ORDER — TRIAMCINOLONE ACETONIDE 1 MG/G
CREAM TOPICAL
COMMUNITY
Start: 2022-05-17 | End: 2023-08-21

## 2022-10-06 RX ORDER — VALACYCLOVIR HYDROCHLORIDE 500 MG/1
TABLET, FILM COATED ORAL
Qty: 16 TABLET | Refills: 1 | Status: ON HOLD | OUTPATIENT
Start: 2022-10-06 | End: 2024-03-22 | Stop reason: HOSPADM

## 2022-10-06 NOTE — PROGRESS NOTES
I hereby acknowledge that I am relying upon documentation authored by a medical student working under my supervision and further I hereby attest that I have verified the student documentation or findings by personally re-performing the physical exam and medical decision making activities of the Evaluation and Management service to be billed.\    Leigh Zepeda    Chief Complaint  Chief Complaint   Patient presents with    Annual Exam       HPI  Bety Cristobal is a 37 y.o. female with multiple medical diagnoses as listed in the medical history and problem list that presents for annual exam.  Their last appointment with primary care was Visit date not found.      Patient reports she has been feeling well and has no acute complaints. She recently followed up with her urologist regarding her kidney stones and states condition is managed with with potassium citrate and good hydration. She has not had a symptomatic kidney stone episode since 2018. She also recently followed up with her OBGYN and has no acute issues. She is on OCPs and is with the same male partner. She states her labial herpes flare-ups occur every few months and are well managed with Valtrex PRN. She was recently diagnosed with perioral dermatitis by her dermatologist but states this has since resolved with treatment.     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 She stays active by playing with and taking care of her 2 year old son. She states she has a balanced diet and other good lifestyle. She denies any recent stressors or any kind and feels happy and well.     Patient denies all other symptoms.     PAST MEDICAL HISTORY:  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     Kidney stones        PAST SURGICAL HISTORY:  Past Surgical History:   Procedure Laterality Date     SECTION         SOCIAL HISTORY:  Social History     Socioeconomic History     Marital status:     Number of children: 1   Occupational History    Occupation:      Comment: planner, works at [employer]   Tobacco Use    Smoking status: Never    Smokeless tobacco: Never   Substance and Sexual Activity    Alcohol use: Yes     Alcohol/week: 1.0 standard drink     Types: 1 Glasses of wine per week     Comment: socially    Drug use: Never    Sexual activity: Yes     Partners: Male     Birth control/protection: OCP   Social History Narrative    [employer] son 7 months.    [employer].    Dairy allergy.       FAMILY HISTORY:  Family History   Problem Relation Age of Onset    Hypertension Mother     Coronary artery disease Mother         stent    Kidney disease Father         kidney stones    No Known Problems Sister     Breast cancer Neg Hx     Ovarian cancer Neg Hx     Colon cancer Neg Hx        ALLERGIES AND MEDICATIONS: updated and reviewed.  Review of patient's allergies indicates:   Allergen Reactions    Doxycycline Anaphylaxis     esopahgeal spasms     Current Outpatient Medications   Medication Sig Dispense Refill    COVID-19 vac, sb,Pfizer,,PF, (PFIZER COVID-19) 30 mcg/0.3 mL injection       norgestimate-ethinyl estradioL (TRI-LO-SPRINTEC) 0.18/0.215/0.25 mg-25 mcg tablet Take 1 tablet by mouth once daily. 90 tablet 3    potassium citrate (UROCIT-K 15) 15 mEq TbSR Use as directed 1 tablet in the mouth or throat once daily. 356 tablet 1    triamcinolone acetonide 0.1% (KENALOG) 0.1 % cream       valACYclovir (VALTREX) 500 MG tablet Take 2 pills twice a day if flare 16 tablet 1     No current facility-administered medications for this visit.         ROS  Review of Systems   Constitutional: Negative.    HENT: Negative.     Eyes: Negative.    Respiratory: Negative.     Cardiovascular: Negative.    Gastrointestinal: Negative.    Endocrine: Negative.    Genitourinary: Negative.    Musculoskeletal: Negative.    Allergic/Immunologic: Negative.    Neurological: Negative.   "  Hematological: Negative.    Psychiatric/Behavioral: Negative.           Physical Exam  Vitals:    10/06/22 0919   BP: 120/74   BP Location: Left arm   Patient Position: Sitting   BP Method: Large (Manual)   Pulse: 83   Resp: 16   Temp: 98.9 °F (37.2 °C)   TempSrc: Oral   SpO2: 97%   Weight: 58.8 kg (129 lb 10.1 oz)   Height: 5' 6" (1.676 m)    Body mass index is 20.92 kg/m².  Weight: 58.8 kg (129 lb 10.1 oz)   Height: 5' 6" (167.6 cm)     Physical Exam  Constitutional:       Appearance: Normal appearance. She is normal weight.   HENT:      Head: Normocephalic and atraumatic.      Mouth/Throat:      Mouth: Mucous membranes are moist.      Pharynx: Oropharynx is clear.   Eyes:      Extraocular Movements: Extraocular movements intact.      Conjunctiva/sclera: Conjunctivae normal.      Pupils: Pupils are equal, round, and reactive to light.   Cardiovascular:      Rate and Rhythm: Normal rate and regular rhythm.      Pulses: Normal pulses.      Heart sounds: Normal heart sounds.   Pulmonary:      Effort: Pulmonary effort is normal.      Breath sounds: Normal breath sounds.   Abdominal:      General: Abdomen is flat.      Palpations: Abdomen is soft.   Musculoskeletal:         General: Normal range of motion.   Skin:     General: Skin is warm and dry.      Capillary Refill: Capillary refill takes less than 2 seconds.   Neurological:      General: No focal deficit present.      Mental Status: She is alert and oriented to person, place, and time.   Psychiatric:         Mood and Affect: Mood normal.         Behavior: Behavior normal.         Health Maintenance         Date Due Completion Date    Hepatitis C Screening Never done ---    Cervical Cancer Screening Never done ---    TETANUS VACCINE Never done ---    COVID-19 Vaccine (2 - Pfizer series) 01/16/2022 12/26/2021    Influenza Vaccine (1) Never done ---    HIV Screening 06/21/2027 (Originally 5/7/2002) ---              Assessment and Plan:  Bety was seen today for " annual exam.    Diagnoses and all orders for this visit:    Encounter for annual physical exam  -     CBC Auto Differential; Future  -     Comprehensive Metabolic Panel; Future  -     Hemoglobin A1C; Future  -     TSH; Future  -     Lipid Panel; Future  -     Hepatitis C Antibody; Future    Will do usual blood work, discussed preventative measures as per HPI. Discussed lifestyle that is excellent. Non smoker.    Herpes labialis  Patient has flare-ups every few months. Condition well managed with Valtrex PRN. No medication changes needed at this time.     History of kidney stones  Patient followed up with Urologist Dr. Marroquin on 9/21. U/S done yesterday showed Ri mid renal pole simple cyst and nonobstructive stone. Patient asx since 2018 and taking potassium citrate and drinking plenty of fluids.   Condition managed well. Urology following.     Perioral dermatitis  Patient seen by dermatologist who prescribed Kenalog cream and issue has since resolved. No other medications needed at this time.

## 2022-10-06 NOTE — TELEPHONE ENCOUNTER
"The pt was informed and verbalized understanding.    ----- Message from Vaishali Marroquin MD sent at 10/6/2022  7:17 AM CDT -----  Notified patient via portal, please alert patient. "Reviewed imaging, a very small stone was noted in your R kidney. This is similar to your CT scan from last year. Please continue your current regimen. Follow up in 1 year ( sooner if noted to have suspicious pain in the right kidney area). All the best"  "

## 2022-10-06 NOTE — PROGRESS NOTES
"Notified patient via portal, please alert patient. "Reviewed imaging, a very small stone was noted in your R kidney. This is similar to your CT scan from last year. Please continue your current regimen. Follow up in 1 year ( sooner if noted to have suspicious pain in the right kidney area). All the best""

## 2022-10-06 NOTE — PROGRESS NOTES
Health Maintenance Due   Topic     Hepatitis C Screening      Cervical Cancer Screening      TETANUS VACCINE      COVID-19 Vaccine (2 - Pfizer series)     Influenza Vaccine (1)

## 2022-10-10 ENCOUNTER — PATIENT MESSAGE (OUTPATIENT)
Dept: ADMINISTRATIVE | Facility: HOSPITAL | Age: 37
End: 2022-10-10
Payer: OTHER GOVERNMENT

## 2023-01-09 ENCOUNTER — PATIENT MESSAGE (OUTPATIENT)
Dept: ADMINISTRATIVE | Facility: HOSPITAL | Age: 38
End: 2023-01-09
Payer: OTHER GOVERNMENT

## 2023-03-29 ENCOUNTER — OFFICE VISIT (OUTPATIENT)
Dept: FAMILY MEDICINE | Facility: CLINIC | Age: 38
End: 2023-03-29
Payer: OTHER GOVERNMENT

## 2023-03-29 VITALS
DIASTOLIC BLOOD PRESSURE: 68 MMHG | TEMPERATURE: 98 F | HEART RATE: 90 BPM | SYSTOLIC BLOOD PRESSURE: 120 MMHG | HEIGHT: 66 IN | WEIGHT: 128.75 LBS | BODY MASS INDEX: 20.69 KG/M2 | RESPIRATION RATE: 16 BRPM | OXYGEN SATURATION: 98 %

## 2023-03-29 DIAGNOSIS — M25.519 SHOULDER PAIN, UNSPECIFIED CHRONICITY, UNSPECIFIED LATERALITY: Primary | ICD-10-CM

## 2023-03-29 DIAGNOSIS — Z87.442 HISTORY OF KIDNEY STONES: ICD-10-CM

## 2023-03-29 DIAGNOSIS — Z86.19 H/O HERPES LABIALIS: ICD-10-CM

## 2023-03-29 PROCEDURE — 99214 OFFICE O/P EST MOD 30 MIN: CPT | Mod: PBBFAC,PO | Performed by: INTERNAL MEDICINE

## 2023-03-29 PROCEDURE — 99999 PR PBB SHADOW E&M-EST. PATIENT-LVL IV: ICD-10-PCS | Mod: PBBFAC,,, | Performed by: INTERNAL MEDICINE

## 2023-03-29 PROCEDURE — 99214 OFFICE O/P EST MOD 30 MIN: CPT | Mod: S$PBB,,, | Performed by: INTERNAL MEDICINE

## 2023-03-29 PROCEDURE — 99214 PR OFFICE/OUTPT VISIT, EST, LEVL IV, 30-39 MIN: ICD-10-PCS | Mod: S$PBB,,, | Performed by: INTERNAL MEDICINE

## 2023-03-29 PROCEDURE — 99999 PR PBB SHADOW E&M-EST. PATIENT-LVL IV: CPT | Mod: PBBFAC,,, | Performed by: INTERNAL MEDICINE

## 2023-03-29 RX ORDER — MELOXICAM 15 MG/1
15 TABLET ORAL DAILY PRN
Qty: 30 TABLET | Refills: 1 | Status: SHIPPED | OUTPATIENT
Start: 2023-03-29 | End: 2023-06-06

## 2023-03-29 NOTE — PROGRESS NOTES
Subjective:       Patient ID: Bety Cristobal is a pleasant 37 y.o. White female patient    Chief Complaint: Pain (Left shoulder)      Patient is a pt I saw last on 10/06/2022 for an annual physical exam.    HPI     Pt with PMH as per list of problems below who comes today due to pain of L shoulder.  Reports that it has been present for months, fluctuating, no initial trauma.  She has a 2 year old she has to carry sometimes. 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  Pain is worse as night when she sleeps on L side, or if on R side if she tends to turn her L arm on the R.  She has no pain today.  She may take ibuprofen that helps.    Patient Active Problem List   Diagnosis    Kidney stones    H/O herpes labialis- oral outbreaks only    Shoulder pain          ACTIVE MEDICAL ISSUES:  Documented in Problem List     PAST MEDICAL HISTORY  Documented     PAST SURGICAL HISTORY:  Documented     SOCIAL HISTORY:  Documented     FAMILY HISTORY:  Documented     ALLERGIES AND MEDICATIONS: updated and reviewed.  Documented    Review of Systems   Constitutional: Negative.    HENT: Negative.     Respiratory: Negative.     Cardiovascular: Negative.    Musculoskeletal:  Positive for arthralgias (L shoulder).   All other systems reviewed and are negative.    Objective:      Physical Exam  Vitals and nursing note reviewed.   Constitutional:       Appearance: Normal appearance. She is normal weight.   Cardiovascular:      Rate and Rhythm: Normal rate and regular rhythm.   Pulmonary:      Effort: Pulmonary effort is normal.      Breath sounds: Normal breath sounds.   Musculoskeletal:      Right shoulder: Normal.      Left shoulder: No swelling or deformity. Normal range of motion.        Arms:    Neurological:      Mental Status: She is alert.       Vitals:    03/29/23 0951   BP: 120/68   BP Location: Right arm   Patient Position: Sitting   BP Method: Large (Manual)   Pulse: 90   Resp:  "16   Temp: 98.1 °F (36.7 °C)   TempSrc: Oral   SpO2: 98%   Weight: 58.4 kg (128 lb 12 oz)   Height: 5' 6" (1.676 m)     Body mass index is 20.78 kg/m².    RESULTS: Reviewed labs from last 12 months    Last Lab Results:     Lab Results   Component Value Date    WBC 8.23 10/06/2022    HGB 12.9 10/06/2022    HCT 40.7 10/06/2022     10/06/2022     10/06/2022    K 4.4 10/06/2022     10/06/2022    CO2 22 (L) 10/06/2022    BUN 10 10/06/2022    CREATININE 0.8 10/06/2022    CALCIUM 9.7 10/06/2022    ALBUMIN 3.7 10/06/2022    AST 20 10/06/2022    ALT 16 10/06/2022    CHOL 160 10/06/2022    TRIG 131 10/06/2022    HDL 57 10/06/2022    LDLCALC 76.8 10/06/2022    HGBA1C 5.0 10/06/2022    TSH 1.232 10/06/2022     Assessment:       1. Shoulder pain, unspecified chronicity, unspecified laterality    2. H/O herpes labialis    3. History of kidney stones          Plan:   Bety was seen today for pain.    Diagnoses and all orders for this visit:    Shoulder pain, unspecified chronicity, unspecified laterality  -     meloxicam (MOBIC) 15 MG tablet; Take 1 tablet (15 mg total) by mouth daily as needed for Pain.  -     Ambulatory referral/consult to Physical/Occupational Therapy; Future    See HPI. ROM fine. Seems to be MSK or tendonitis. Will give meloxicam with all usual recommendations for safe use. Referral to PT. If not better, will refer to Orthopedics.    H/O herpes labialis    Not an issue today.    History of kidney stones    Not an issue today.    No follow-ups on file.    This note was created by combination of typed  and M-Modal dictation.  Transcription errors may be present.  If there are any questions, please contact me.  "

## 2023-03-29 NOTE — PROGRESS NOTES
Health Maintenance Due   Topic     Cervical Cancer Screening      TETANUS VACCINE      COVID-19 Vaccine (2 - Pfizer series)     Influenza Vaccine (1)

## 2023-04-12 ENCOUNTER — PATIENT MESSAGE (OUTPATIENT)
Dept: ADMINISTRATIVE | Facility: HOSPITAL | Age: 38
End: 2023-04-12
Payer: OTHER GOVERNMENT

## 2023-04-17 NOTE — PROGRESS NOTES
"[facility]  Physical Therapy Initial Evaluation    Name: Bety Cristobal  Clinic Number: [id number]    Therapy Diagnosis:   Encounter Diagnosis   Name Primary?    Chronic shoulder pain, unspecified laterality Yes     Physician: Leigh Zepeda MD    Physician Orders: Physical Therapy Evaluate and Treat  Medical Diagnosis from Referral: Shoulder pain, unspecified chronicity, unspecified laterality [M25.519]  Evaluation Date: 2023  Authorization Period Expiration: 8/3/2023  Plan of Care Expiration: 2023 to 2023  Visit # / Visits authorized:  (pending additional authorization following initial evaluation)     Time In: 10:00 am    Time Out: 10:50 am  Total Billable Time: 50 minutes    Precautions:  None    Subjective     Date of onset: insidious, a few months ago     History of current condition - Bety reports: experiencing pain in L posterior shoulder. Patient reports the pain is "deep" within the musculature. Pt reports her pain does not affect her everyday life, but she is having issues sleeping. Patient's doctor has prescribed pain medication for pain/sleep disturbances. Patient reports non-compliance with prescription. 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 Patient states using R arm more due to pain.        Medical History:   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     Kidney stones        Surgical History:   Bety Cristobal  has a past surgical history that includes  section.    Medications:   Bety has a current medication list which includes the following prescription(s): covid-19 vac, sb(pfizer)(pf), meloxicam, norgestimate-ethinyl estradiol, potassium citrate, triamcinolone acetonide 0.1%, and valacyclovir.    Allergies:   Review of patient's allergies indicates:   Allergen Reactions    Doxycycline " Anaphylaxis     esopahgeal spasms        Imaging: None     Prior Therapy: N/A  Social History:  lives with their family  Occupation: wedding dress advisor    Prior Level of Function: patient able to perform ADLS and work duties   Current Level of Function: patient unable to perform work duties without experience L posterior shoulder pain    Pain:  Current 2/10, worst 8/10, best 0/10   Location: left shoulder   Description: Aching and Throbbing  Aggravating Factors: Night Time (sleeping on side) and Lifting  Easing Factors: pain medication and rest    Pts goals: Pt would like to return to work duties with no increase in L shoulder pain.    Objective     WNL=within normal limits  WFL=within functional limits  NT=not tested  *=pain    Posture: patient presents with a forward head posture and rounded shoulders   Palpation: Pain reported with palpation to greater tubercle of humerus   Sensation: TBA  Deep tendon reflexes: TBA        Cervical ROM:  Cervical Right   Flexion NT   Extension NT   Right rotation NT   Left rotation NT       Cervical Cluster:  Spurling's NT NT   Distraction NT NT   ULTT A NT NT   Ipsilateral Rotation <60 degrees NT NT       Active Range of Motion:   Shoulder Right Left   Flexion 171 180   Abduction 180 160   ER  78 70   IR 90 80   Relieves with IN: forward head posture rounded shoulder     Strength:  Shoulder Right Left   Flexion 4/5 4/5   ER 4/5 4-/5   IR 4-/5 4-/5   Upper trap 4/5 4/5   Middle Trapezius 4+/5 4/5   Lower Trapezius TBA TBA   Serratus Anterior TBA TBA   Rhomboids TBA TBA       Special Tests:   Right Left   AC Joint Compression Test NT NT   Painful arc (--) (+)   Drop Arm test (--) (--)   Subscaputlaris Lift Off NT NT   Clunk test NT NT   O'yovani's test NT NT   Hawkin's Kenndy (--) (--)     Dynamometer Measurements: (taken standing with elbow at 90 degrees flexion):  Right: TBA lbs  Left: TBA lbs      CMS Impairment/Limitation/Restriction for FOTO Survey    Therapist reviewed  "FOTO scores for Bety Cristobal on 4/19/2023.   FOTO documents entered into [employer] - see Media section.    Limitation Score: 26%  Predicted Goal: 23%    Category: Mobility     TREATMENT     Treatment Time In: 10:00 am   Treatment Time Out: 10:50 am   Total Treatment time separate from Evaluation: 15 minutes    Therapeutic Exercises were provided for 15 minutes to improve strength and AROM including:  L Prone Y, T's 2x10 #0  Seated scapular squeeze with pillow in lap to support UE's 10x5" hold  Open Books w/ Foam Roller R/L 5x5 hold    Manual Therapy was provided for 0 minutes for improved joint mobility including:      Home Exercises and Patient Education Provided:    Education provided:   - Findings; prognosis and plan of care (POC)  - Home exercise program (HEP)  - Modality options  - Therapist contact information    Written Home Exercises Provided: Yes  Exercises were reviewed and Bety was able to demonstrate them prior to the end of the session.  Bety demonstrated good understanding of the education provided.     See EMR under Patient Instructions for exercises provided today.    Assessment     Bety is a 37 y.o. female referred to outpatient Physical Therapy with a medical diagnosis of Shoulder pain, unspecified chronicity, unspecified laterality [M25.519]. Pt presents to PT with pain, decreased shoulder ROM, decreased strength and flexibility, poor posture, and functional deficits with lifting and carry more than 50 lbs. These deficits are negatively impacting this patient's ability to complete their work duties and activities of daily living.     Pt prognosis is Good.   Pt will benefit from skilled outpatient Physical Therapy to address the deficits stated above and in the chart below, provide pt/family education, and to maximize pt's level of independence.     Plan of care discussed with patient: Yes  Pt's spiritual, cultural and educational needs considered and pt agreeable to plan of care and " goals as stated below:     Anticipated Barriers for therapy: None    Medical Necessity is demonstrated by the following  History  Co-morbidities and personal factors that may impact the plan of care Co-morbidities:   young age    Personal Factors:   age  lifestyle  character     low   Examination  Body Structures and Functions, activity limitations and participation restrictions that may impact the plan of care Body Regions:   upper extremities(shoulder)    Body Systems:    ROM  strength    Participation Restrictions:   Walking    Activity limitations:   Learning and applying knowledge  no deficits    General Tasks and Commands  No Deficits    Communication  No Deficits    Mobility  lifting and carrying objects    Self care  washing oneself (bathing, drying, washing hands)  caring for body parts (brushing teeth, shaving, grooming)    Domestic Life  No Deficits    Interactions/Relationships  No Deficits    Life Areas  No Deficits    Community and Social Life  No Deficits         low   Clinical Presentation stable and uncomplicated low   Decision Making/ Complexity Score: low     GOALS:  Short Term Goals (4 Weeks):   1. Patient will be compliant with home exercise program to assist therapy in restoring pain free motion of the shoulder. (Progressing, not met)  2. Patient will improve impaired shoulder manual muscle tests 1/2 grade bilaterally to improve strength for functional tasks.(Progressing, not met)  3. Patient will improve left shoulder flexion >/= 20 degrees to improve functional mobility of upper extremities.(Progressing, not met)  4. Patient will improve left shoulder abduction >/= 20 degrees to improve functional mobility of upper extremities. (Progressing, not met)     Long Term Goals (8 Weeks):   1. Patient will improve FOTO score to </= 26% to demonstrate improvements in lifting, carrying, moving, and handling objects (Progressing, not met)  2. Patient will improve impaired shoulder manual muscle tests 1  grade bilaterally to improve strength for household duties. (Progressing, not met)  3. Patient will improve left shoulder flexion to >/= 160 degrees to improve functional mobility of upper extremities. (Progressing, not met)  4. Patient will improve left shoulder abduction to >/= 160 degrees to improve functional mobility of upper extremities. (Progressing, not met)  5. Patient will be able to lift 5 boxes overhead without increase to L shoulder pain (Progressing, not met)  6. Patient will be able to carry 25 lb child with L arm without increase to L shoulder pain  (Progressing, not met)       Plan     Plan of care Certification: 4/19/2023 to 6/19/2023.    Outpatient Physical Therapy 2 times weekly for 4 weeks to include the following interventions: Therapeutic Exercises, Manual Therapeutic Technique, Neuromuscular Re Education, Therapeutic Activities. Modalities, Kinesiotape prn, and Functional Dry Needling as needed.    Pt was co-treated by KASSIE Warren under the direct supervision of a Licensed Physical Therapist   Jeanne Abel, PT,  DPT, OCS

## 2023-04-19 ENCOUNTER — CLINICAL SUPPORT (OUTPATIENT)
Dept: REHABILITATION | Facility: OTHER | Age: 38
End: 2023-04-19
Attending: INTERNAL MEDICINE
Payer: OTHER GOVERNMENT

## 2023-04-19 DIAGNOSIS — G89.29 CHRONIC SHOULDER PAIN, UNSPECIFIED LATERALITY: Primary | ICD-10-CM

## 2023-04-19 DIAGNOSIS — M25.519 SHOULDER PAIN, UNSPECIFIED CHRONICITY, UNSPECIFIED LATERALITY: ICD-10-CM

## 2023-04-19 DIAGNOSIS — M25.519 CHRONIC SHOULDER PAIN, UNSPECIFIED LATERALITY: Primary | ICD-10-CM

## 2023-04-19 PROCEDURE — 97530 THERAPEUTIC ACTIVITIES: CPT | Mod: PN

## 2023-04-19 PROCEDURE — 97161 PT EVAL LOW COMPLEX 20 MIN: CPT | Mod: PN

## 2023-04-19 NOTE — PLAN OF CARE
"[facility]  Physical Therapy Initial Evaluation    Name: Bety Cristobal  Clinic Number: [id number]    Therapy Diagnosis:   Encounter Diagnosis   Name Primary?    Chronic shoulder pain, unspecified laterality Yes     Physician: Leigh Zepeda MD    Physician Orders: Physical Therapy Evaluate and Treat  Medical Diagnosis from Referral: Shoulder pain, unspecified chronicity, unspecified laterality [M25.519]  Evaluation Date: 2023  Authorization Period Expiration: 8/3/2023  Plan of Care Expiration: 2023 to 2023  Visit # / Visits authorized:  (pending additional authorization following initial evaluation)     Time In: 10:00 am    Time Out: 10:50 am  Total Billable Time: 50 minutes    Precautions: None    Subjective     Date of onset: insidious, a few months ago     History of current condition - Bety reports: experiencing pain in L posterior shoulder. Patient reports the pain is "deep" within the musculature. Pt reports her pain does not affect her everyday life, but she is having issues sleeping. Patient's doctor has prescribed pain medication for pain/sleep disturbances. Patient reports non-compliance with prescription. 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 Patient states using R arm more due to pain.        Medical History:   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     Kidney stones        Surgical History:   Bety Cristobal  has a past surgical history that includes  section.    Medications:   Bety has a current medication list which includes the following prescription(s): covid-19 vac, sb(pfizer)(pf), meloxicam, norgestimate-ethinyl estradiol, potassium citrate, triamcinolone acetonide 0.1%, and valacyclovir.    Allergies:   Review of patient's allergies indicates:   Allergen Reactions    Doxycycline " Anaphylaxis     esopahgeal spasms        Imaging: None     Prior Therapy: N/A  Social History:  lives with their family  Occupation: wedding dress advisor    Prior Level of Function: patient able to perform ADLS and work duties   Current Level of Function: patient unable to perform work duties without experience L posterior shoulder pain    Pain:  Current 2/10, worst 8/10, best 0/10   Location: left shoulder   Description: Aching and Throbbing  Aggravating Factors: Night Time (sleeping on side) and Lifting  Easing Factors: pain medication and rest    Pts goals: Pt would like to return to work duties with no increase in L shoulder pain.    Objective     WNL=within normal limits  WFL=within functional limits  NT=not tested  *=pain    Posture: patient presents with a forward head posture and rounded shoulders   Palpation: Pain reported with palpation to greater tubercle of humerus   Sensation: TBA  Deep tendon reflexes: TBA        Cervical ROM:  Cervical Right   Flexion NT   Extension NT   Right rotation NT   Left rotation NT       Cervical Cluster:  Spurling's NT NT   Distraction NT NT   ULTT A NT NT   Ipsilateral Rotation <60 degrees NT NT       Active Range of Motion:   Shoulder Right Left   Flexion 171 180   Abduction 180 160   ER  78 70   IR 90 80   Relieves with IN: forward head posture rounded shoulder     Strength:  Shoulder Right Left   Flexion 4/5 4/5   ER 4/5 4-/5   IR 4-/5 4-/5   Upper trap 4/5 4/5   Middle Trapezius 4+/5 4/5   Lower Trapezius TBA TBA   Serratus Anterior TBA TBA   Rhomboids TBA TBA       Special Tests:   Right Left   AC Joint Compression Test NT NT   Painful arc (--) (+)   Drop Arm test (--) (--)   Subscaputlaris Lift Off NT NT   Clunk test NT NT   O'yovani's test NT NT   Hawkin's Kenndy (--) (--)     Dynamometer Measurements: (taken standing with elbow at 90 degrees flexion):  Right: TBA lbs  Left: TBA lbs      CMS Impairment/Limitation/Restriction for FOTO Survey    Therapist reviewed  "FOTO scores for Bety Cristobal on 4/19/2023.   FOTO documents entered into [employer] - see Media section.    Limitation Score: 26%  Predicted Goal: 23%    Category: Mobility     TREATMENT     Treatment Time In: 10:00 am   Treatment Time Out: 10:50 am   Total Treatment time separate from Evaluation: 15 minutes    Therapeutic Exercises were provided for 15 minutes to improve strength and AROM including:  L Prone Y, T's 2x10 #0  Seated scapular squeeze with pillow in lap to support UE's 10x5" hold  Open Books w/ Foam Roller R/L 5x5 hold    Manual Therapy was provided for 0 minutes for improved joint mobility including:      Home Exercises and Patient Education Provided:    Education provided:   - Findings; prognosis and plan of care (POC)  - Home exercise program (HEP)  - Modality options  - Therapist contact information    Written Home Exercises Provided: Yes  Exercises were reviewed and Bety was able to demonstrate them prior to the end of the session.  Bety demonstrated good understanding of the education provided.     See EMR under Patient Instructions for exercises provided today.    Assessment     Bety is a 37 y.o. female referred to outpatient Physical Therapy with a medical diagnosis of Shoulder pain, unspecified chronicity, unspecified laterality [M25.519]. Pt presents to PT with pain, decreased shoulder ROM, decreased strength and flexibility, poor posture, and functional deficits with lifting and carry more than 50 lbs. These deficits are negatively impacting this patient's ability to complete their work duties and activities of daily living.     Pt prognosis is Good.   Pt will benefit from skilled outpatient Physical Therapy to address the deficits stated above and in the chart below, provide pt/family education, and to maximize pt's level of independence.     Plan of care discussed with patient: Yes  Pt's spiritual, cultural and educational needs considered and pt agreeable to plan of care and " goals as stated below:     Anticipated Barriers for therapy: None    Medical Necessity is demonstrated by the following  History  Co-morbidities and personal factors that may impact the plan of care Co-morbidities:   young age    Personal Factors:   age  lifestyle  character     low   Examination  Body Structures and Functions, activity limitations and participation restrictions that may impact the plan of care Body Regions:   upper extremities(shoulder)    Body Systems:    ROM  strength    Participation Restrictions:   Walking    Activity limitations:   Learning and applying knowledge  no deficits    General Tasks and Commands  No Deficits    Communication  No Deficits    Mobility  lifting and carrying objects    Self care  washing oneself (bathing, drying, washing hands)  caring for body parts (brushing teeth, shaving, grooming)    Domestic Life  No Deficits    Interactions/Relationships  No Deficits    Life Areas  No Deficits    Community and Social Life  No Deficits         low   Clinical Presentation stable and uncomplicated low   Decision Making/ Complexity Score: low     GOALS:  Short Term Goals (4 Weeks):   1. Patient will be compliant with home exercise program to assist therapy in restoring pain free motion of the shoulder. (Progressing, not met)  2. Patient will improve impaired shoulder manual muscle tests 1/2 grade bilaterally to improve strength for functional tasks.(Progressing, not met)  3. Patient will improve left shoulder flexion >/= 20 degrees to improve functional mobility of upper extremities.(Progressing, not met)  4. Patient will improve left shoulder abduction >/= 20 degrees to improve functional mobility of upper extremities. (Progressing, not met)     Long Term Goals (8 Weeks):   1. Patient will improve FOTO score to </= 26% to demonstrate improvements in lifting, carrying, moving, and handling objects (Progressing, not met)  2. Patient will improve impaired shoulder manual muscle tests 1  grade bilaterally to improve strength for household duties. (Progressing, not met)  3. Patient will improve left shoulder flexion to >/= 160 degrees to improve functional mobility of upper extremities. (Progressing, not met)  4. Patient will improve left shoulder abduction to >/= 160 degrees to improve functional mobility of upper extremities. (Progressing, not met)  5. Patient will be able to lift 5 boxes overhead without increase to L shoulder pain (Progressing, not met)  6. Patient will be able to carry 25 lb child with L arm without increase to L shoulder pain  (Progressing, not met)       Plan     Plan of care Certification: 4/19/2023 to 6/19/2023.    Outpatient Physical Therapy 2 times weekly for 4 weeks to include the following interventions: Therapeutic Exercises, Manual Therapeutic Technique, Neuromuscular Re Education, Therapeutic Activities. Modalities, Kinesiotape prn, and Functional Dry Needling as needed.    Pt was co-treated by KASSIE Warren under the direct supervision of a Licensed Physical Therapist   Jeanne Abel, PT,  DPT, OCS

## 2023-05-03 ENCOUNTER — CLINICAL SUPPORT (OUTPATIENT)
Dept: REHABILITATION | Facility: OTHER | Age: 38
End: 2023-05-03
Payer: OTHER GOVERNMENT

## 2023-05-03 DIAGNOSIS — G89.29 CHRONIC SHOULDER PAIN, UNSPECIFIED LATERALITY: Primary | ICD-10-CM

## 2023-05-03 DIAGNOSIS — M25.519 CHRONIC SHOULDER PAIN, UNSPECIFIED LATERALITY: Primary | ICD-10-CM

## 2023-05-03 PROCEDURE — 97530 THERAPEUTIC ACTIVITIES: CPT | Mod: PN

## 2023-05-03 PROCEDURE — 97140 MANUAL THERAPY 1/> REGIONS: CPT | Mod: PN

## 2023-05-03 PROCEDURE — 97110 THERAPEUTIC EXERCISES: CPT | Mod: PN

## 2023-05-03 PROCEDURE — 97112 NEUROMUSCULAR REEDUCATION: CPT | Mod: PN

## 2023-05-03 NOTE — PROGRESS NOTES
"[facility]   Physical Therapy Treatment Note     Name: Bety Cristobal  Clinic Number: [id number]    Therapy Diagnosis:   Encounter Diagnosis   Name Primary?    Chronic shoulder pain, unspecified laterality Yes     Physician: Leigh Zepeda MD    Visit Date: 5/3/2023    Physician Orders: PT Evaluate and Treat  Medical Diagnosis: Pain in unspecified shoulder [M25.519]  Evaluation Date: 4/19/23  Authorization Period Expiration: 8/3/23  Plan of Care Certification Period: 4/19/23- 7/19/23  Progress Note Due: 5/19/23    Visit # / Visits authorized: 2/ 15   FOTO: 1/ 3       Precautions: Standard    Time In: 1000am  Time Out: 1100am  Total Billable Time: 60 minutes      SUBJECTIVE     Pt reports: that she is doing well today. Pt states that she completes her home exercises "most days" and feels stronger. 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  She was compliant with home exercise program.  Response to previous treatment: decreased posterior left shoulder pain  Functional change: improved ability to complete job duties    Pain: 2/10  Location: Posterior left shoulder    OBJECTIVE     Objective Measures updated at progress report unless specified.       TREATMENT     Total Treatment time (time-based codes) separate from Evaluation: 60 minutes     Bety received the treatments listed below:      Patient received therapeutic exercises for 8 minutes for improved strength and AROM including:  Standing rows vs RTB 3x10 repetitions      Patient received manual therapeutic technique for 8 minutes for improved soft tissue and joint mobility including:  L scapular upward rotation mobilization Gr IV  L GH inferior glide Gr III        Patient received neuromuscular reeducation for 14 minutes for improved proprioception and balance including:  L Prone Y vs 1# 3x10 repetitions  L Prone T vs 2# 3x10 repetitions  Seated scapular squeeze with pillow " "in lap to support UE's 10x5" hold  Open Books w/ Foam Roller R/L 5x5 hold  Slouch/correct on 1/2 foam roller 20x        Patient received therapeutic activities for 30 minutes for improved tolerance to functional activities including:  UBE 3min/3min level 1.5  Ball circles on wall vs red weighted medicine ball 30x CW/CCW 2 sets  Serratus touchdown wall slides vs looped YTB 20x   Serratus wall clocks vs looped YTB 10x          PATIENT EDUCATION AND HOME EXERCISES     Home Exercises Provided and Patient Education Provided     Education provided:   PT educated pt on importance of compliance with their HEP this visit.     Written Home Exercises Provided: yes. Exercises were reviewed and Bety was able to demonstrate them prior to the end of the session.  Bety demonstrated good  understanding of the education provided. See EMR under Patient Instructions for exercises provided during therapy sessions    ASSESSMENT   Bety tolerated treatment session well today and completed all exercises with no increase in shoulder pain. Pt became fatigued with ball on wall exercise and requires rest breaks, evidencing her need for further endurance training. Continue PT POC.      Bety Is progressing well towards her goals.   Pt prognosis is Good.     Pt will continue to benefit from skilled outpatient physical therapy to address the deficits listed in the problem list box on initial evaluation, provide pt/family education and to maximize pt's level of independence in the home and community environment.     Pt's spiritual, cultural and educational needs considered and pt agreeable to plan of care and goals.     Anticipated barriers to physical therapy: None    Goals:   GOALS:  Short Term Goals (4 Weeks):   1. Patient will be compliant with home exercise program to assist therapy in restoring pain free motion of the shoulder. (Progressing, not met)  2. Patient will improve impaired shoulder manual muscle tests 1/2 grade bilaterally " to improve strength for functional tasks.(Progressing, not met)  3. Patient will improve left shoulder flexion >/= 20 degrees to improve functional mobility of upper extremities.(Progressing, not met)  4. Patient will improve left shoulder abduction >/= 20 degrees to improve functional mobility of upper extremities. (Progressing, not met)     Long Term Goals (8 Weeks):   1. Patient will improve FOTO score to </= 26% to demonstrate improvements in lifting, carrying, moving, and handling objects (Progressing, not met)  2. Patient will improve impaired shoulder manual muscle tests 1 grade bilaterally to improve strength for household duties. (Progressing, not met)  3. Patient will improve left shoulder flexion to >/= 160 degrees to improve functional mobility of upper extremities. (Progressing, not met)  4. Patient will improve left shoulder abduction to >/= 160 degrees to improve functional mobility of upper extremities. (Progressing, not met)  5. Patient will be able to lift 5 boxes overhead without increase to L shoulder pain (Progressing, not met)  6. Patient will be able to carry 25 lb child with L arm without increase to L shoulder pain  (Progressing, not met)     PLAN   Plan of care Certification: 4/19/2023 to 6/19/2023.     Outpatient Physical Therapy 2 times weekly for 4 weeks to include the following interventions: Therapeutic Exercises, Manual Therapeutic Technique, Neuromuscular Re Education, Therapeutic Activities. Modalities, Kinesiotape prn, and Functional Dry Needling as needed.      Jeanne Abel, PT

## 2023-05-07 ENCOUNTER — PATIENT MESSAGE (OUTPATIENT)
Dept: OBSTETRICS AND GYNECOLOGY | Facility: CLINIC | Age: 38
End: 2023-05-07
Payer: OTHER GOVERNMENT

## 2023-05-08 ENCOUNTER — PATIENT MESSAGE (OUTPATIENT)
Dept: OBSTETRICS AND GYNECOLOGY | Facility: CLINIC | Age: 38
End: 2023-05-08
Payer: OTHER GOVERNMENT

## 2023-06-02 ENCOUNTER — OFFICE VISIT (OUTPATIENT)
Dept: OBSTETRICS AND GYNECOLOGY | Facility: CLINIC | Age: 38
End: 2023-06-02
Attending: OBSTETRICS & GYNECOLOGY
Payer: OTHER GOVERNMENT

## 2023-06-02 DIAGNOSIS — N92.6 IRREGULAR MENSES: Primary | ICD-10-CM

## 2023-06-02 PROCEDURE — 99213 PR OFFICE/OUTPT VISIT, EST, LEVL III, 20-29 MIN: ICD-10-PCS | Mod: 95,,, | Performed by: OBSTETRICS & GYNECOLOGY

## 2023-06-02 PROCEDURE — 99213 OFFICE O/P EST LOW 20 MIN: CPT | Mod: 95,,, | Performed by: OBSTETRICS & GYNECOLOGY

## 2023-06-02 NOTE — PROGRESS NOTES
The patient location is: home  The chief complaint leading to consultation is: fertility    Visit type: audiovisual    Face to Face time with patient: 20   total minutes of total time spent on the encounter, which includes face to face time and non-face to face time preparing to see the patient (eg, review of tests), Obtaining and/or reviewing separately obtained history, Documenting clinical information in the electronic or other health record, Independently interpreting results (not separately reported) and communicating results to the patient/family/caregiver, or Care coordination (not separately reported).         Each patient to whom he or she provides medical services by telemedicine is:  (1) informed of the relationship between the physician and patient and the respective role of any other health care provider with respect to management of the patient; and (2) notified that he or she may decline to receive medical services by telemedicine and may withdraw from such care at any time.    Notes:     CC:fertility check in    HPI:stopped OCP in feb.  Had a 30 day cycle, then 19, then 14 day cycles.  Then last cycle was 25.  Interested in checking in on fertility.  Reports night sweats with periods. Has had some stressful occurrences.    ROS:  GENERAL: Feeling well overall. Denies fever or chills.   SKIN: Denies rash or lesions.   HEAD: Denies head injury or headache.   NODES: Denies enlarged lymph nodes.   CHEST: Denies chest pain or shortness of breath.   CARDIOVASCULAR: Denies palpitations or left sided chest pain.   ABDOMEN: No abdominal pain, constipation, diarrhea, nausea, vomiting or rectal bleeding.   URINARY: No dysuria, hematuria, or burning on urination.  REPRODUCTIVE: See HPI.   BREASTS: Denies pain, lumps, or nipple discharge.   HEMATOLOGIC: No easy bruisability or excessive bleeding.   MUSCULOSKELETAL: Denies joint pain or swelling.   NEUROLOGIC: Denies syncope or weakness.   PSYCHIATRIC: Denies  depression, anxiety or mood swings.    PE:   APPEARANCE: Well nourished, well developed, White female in no acute distress.        Diagnosis:  1. Irregular menses        Plan:     Orders Placed This Encounter    TSH    T4, FREE    Progesterone    Antimullerian Hormone (AMH)         Follow-up with me based on labs, may consider semen analysis, possible pelvic u/s.    Vika Brooke DO

## 2023-06-06 DIAGNOSIS — M25.519 SHOULDER PAIN, UNSPECIFIED CHRONICITY, UNSPECIFIED LATERALITY: ICD-10-CM

## 2023-06-06 RX ORDER — MELOXICAM 15 MG/1
TABLET ORAL
Qty: 30 TABLET | Refills: 1 | Status: SHIPPED | OUTPATIENT
Start: 2023-06-06 | End: 2023-08-21

## 2023-06-12 ENCOUNTER — PATIENT MESSAGE (OUTPATIENT)
Dept: OBSTETRICS AND GYNECOLOGY | Facility: CLINIC | Age: 38
End: 2023-06-12
Payer: OTHER GOVERNMENT

## 2023-06-19 ENCOUNTER — LAB VISIT (OUTPATIENT)
Dept: LAB | Facility: OTHER | Age: 38
End: 2023-06-19
Attending: OBSTETRICS & GYNECOLOGY
Payer: OTHER GOVERNMENT

## 2023-06-19 DIAGNOSIS — N92.6 IRREGULAR MENSES: ICD-10-CM

## 2023-06-19 LAB
PROGEST SERPL-MCNC: 4.6 NG/ML
T4 FREE SERPL-MCNC: 0.82 NG/DL (ref 0.71–1.51)
TSH SERPL DL<=0.005 MIU/L-ACNC: 1.66 UIU/ML (ref 0.4–4)

## 2023-06-19 PROCEDURE — 84443 ASSAY THYROID STIM HORMONE: CPT | Performed by: OBSTETRICS & GYNECOLOGY

## 2023-06-19 PROCEDURE — 84144 ASSAY OF PROGESTERONE: CPT | Performed by: OBSTETRICS & GYNECOLOGY

## 2023-06-19 PROCEDURE — 83520 IMMUNOASSAY QUANT NOS NONAB: CPT | Performed by: OBSTETRICS & GYNECOLOGY

## 2023-06-19 PROCEDURE — 84439 ASSAY OF FREE THYROXINE: CPT | Performed by: OBSTETRICS & GYNECOLOGY

## 2023-06-20 LAB — MIS SERPL-MCNC: 0.27 NG/ML (ref 0.15–7.5)

## 2023-07-20 ENCOUNTER — CLINICAL SUPPORT (OUTPATIENT)
Dept: OBSTETRICS AND GYNECOLOGY | Facility: CLINIC | Age: 38
End: 2023-07-20
Payer: OTHER GOVERNMENT

## 2023-07-20 DIAGNOSIS — N91.2 AMENORRHEA: Primary | ICD-10-CM

## 2023-08-21 ENCOUNTER — HOSPITAL ENCOUNTER (OUTPATIENT)
Dept: PERINATAL CARE | Facility: OTHER | Age: 38
Discharge: HOME | End: 2023-08-21
Attending: OBSTETRICS & GYNECOLOGY
Payer: OTHER GOVERNMENT

## 2023-08-21 ENCOUNTER — OFFICE VISIT (OUTPATIENT)
Dept: OBSTETRICS AND GYNECOLOGY | Facility: CLINIC | Age: 38
End: 2023-08-21
Payer: OTHER GOVERNMENT

## 2023-08-21 VITALS — BODY MASS INDEX: 21.1 KG/M2 | SYSTOLIC BLOOD PRESSURE: 130 MMHG | DIASTOLIC BLOOD PRESSURE: 85 MMHG | WEIGHT: 130.75 LBS

## 2023-08-21 DIAGNOSIS — Z12.4 PAP SMEAR FOR CERVICAL CANCER SCREENING: ICD-10-CM

## 2023-08-21 DIAGNOSIS — Z11.3 ENCOUNTER FOR SCREENING FOR INFECTIONS WITH A PREDOMINANTLY SEXUAL MODE OF TRANSMISSION: ICD-10-CM

## 2023-08-21 DIAGNOSIS — Z34.81 ENCOUNTER FOR SUPERVISION OF OTHER NORMAL PREGNANCY IN FIRST TRIMESTER: ICD-10-CM

## 2023-08-21 DIAGNOSIS — N91.4 SECONDARY OLIGOMENORRHEA: Primary | ICD-10-CM

## 2023-08-21 DIAGNOSIS — Z32.01 POSITIVE PREGNANCY TEST: ICD-10-CM

## 2023-08-21 DIAGNOSIS — N91.2 AMENORRHEA: ICD-10-CM

## 2023-08-21 PROCEDURE — 76801 OB US < 14 WKS SINGLE FETUS: CPT | Mod: 26,,, | Performed by: OBSTETRICS & GYNECOLOGY

## 2023-08-21 PROCEDURE — 87624 HPV HI-RISK TYP POOLED RSLT: CPT | Performed by: NURSE PRACTITIONER

## 2023-08-21 PROCEDURE — 87086 URINE CULTURE/COLONY COUNT: CPT | Performed by: NURSE PRACTITIONER

## 2023-08-21 PROCEDURE — 87491 CHLMYD TRACH DNA AMP PROBE: CPT | Performed by: NURSE PRACTITIONER

## 2023-08-21 PROCEDURE — 88175 CYTOPATH C/V AUTO FLUID REDO: CPT | Performed by: NURSE PRACTITIONER

## 2023-08-21 PROCEDURE — 99214 OFFICE O/P EST MOD 30 MIN: CPT | Mod: S$PBB,,, | Performed by: NURSE PRACTITIONER

## 2023-08-21 PROCEDURE — 76801 OB US < 14 WKS SINGLE FETUS: CPT

## 2023-08-21 PROCEDURE — 76801 US OB/GYN PROCEDURE (VIEWPOINT): ICD-10-PCS | Mod: 26,,, | Performed by: OBSTETRICS & GYNECOLOGY

## 2023-08-21 PROCEDURE — 99213 OFFICE O/P EST LOW 20 MIN: CPT | Mod: PBBFAC,25 | Performed by: NURSE PRACTITIONER

## 2023-08-21 PROCEDURE — 99214 PR OFFICE/OUTPT VISIT, EST, LEVL IV, 30-39 MIN: ICD-10-PCS | Mod: S$PBB,,, | Performed by: NURSE PRACTITIONER

## 2023-08-21 PROCEDURE — 99999 PR PBB SHADOW E&M-EST. PATIENT-LVL III: ICD-10-PCS | Mod: PBBFAC,,, | Performed by: NURSE PRACTITIONER

## 2023-08-21 PROCEDURE — 87591 N.GONORRHOEAE DNA AMP PROB: CPT | Performed by: NURSE PRACTITIONER

## 2023-08-21 PROCEDURE — 99999 PR PBB SHADOW E&M-EST. PATIENT-LVL III: CPT | Mod: PBBFAC,,, | Performed by: NURSE PRACTITIONER

## 2023-08-21 NOTE — PATIENT INSTRUCTIONS
LABOR AND DELIVERY PHONE NUMBER, [phone] (OPEN 24/7, LOCATED ON 6TH FLOOR OF HOSPITAL)  [address] PHONE NUMBER, [phone] (OPEN MON-FRI, 8a-5p)    1) Eat small frequent meals through the day versus three large meals  2) Try ginger ale or sprite to help settle the stomach   3) Eat crackers or dry toast before getting out of bed in the morning   4) Stay hydrated by drinking plenty of water-do not immediately eat or drink something after vomiting. Give your stomach a rest for 20-30 minutes. Slowly reintroduce ice chips, small sips water, crackers, etc.    5) Try OTC Unisom (use the tablets that have doxylamine not diphenhydramine in them, can use generic brands like Equate) and vitamin B6  (25 mg, can find on [employer]) together at night before bed. This can help with the nausea in the morning and is safe to use during pregnancy. You can also take the vitamin B6 alone, every 8 hours to help with the nausea.     If you are unable to keep anything down and constantly vomiting for more that 24 hours, let the office know so that dehydration can be avoided. We would recommend being seen in the emergency department if this is the case.     Call [phone] to see about coverage and any out of pocket costs regarding the Eacovzrtc61 (MT21) testing. This can be done as early as 9 weeks in most individuals and is blood work only. We recommend waiting until 10 weeks to ensure the most accuracy. This test checks for any chromosomal abnormalities like Down Syndrome. You can also find out the sex of the baby if you choose to know. Once you find out coverage and decide to proceed, send either myself or your doctor a message and we can see what date you can do it. It is done at our second floor lab at [facility].

## 2023-08-21 NOTE — PROGRESS NOTES
CC: Positive Pregnancy Test    HISTORY OF PRESENT ILLNESS:    Bety Cristobal is a 38 y.o. female, ,  Presents today for a routine exam complaining of amenorrhea and positive home urine pregnancy test.  Patient's last menstrual period was 2023 (approximate).  New to me- second pregnancy. Just had dating scan done, see results below. Prior CS x 1 in  for ftp- son at home is almost 3. Lots of nausea but manageable. History of kidney stones so used to nausea. Taking a prenatal vitamin and colace, just bought asa but has not yet started. Some constipation, going but pellets, miralax usually causes diarrhea. Some acid reflux already. Interested in aneuploidy screening.  Fu with Dr. Brooke.    Pregnancy   =========   Dee pregnancy. Number of embryos: 1     Dating   ======   LMP on: 2023   GA by LMP 8 w + 1 d   NICOLA by LMP: 3/31/2024   Ultrasound examination on: 2023   GA by U/S based upon: CRL   GA by U/S 9 w + 1 d   NICOLA by U/S: 3/24/2024   Assigned: based on ultrasound (CRL), selected on 2023   Assigned GA 9 w + 1 d   Assigned NICOLA: 3/24/2024     Assessment   ==========   Gestational sac: visualized   Location: intrauterine   Yolk sac: visualized   Amniotic sac: visualized   Embryo: visualized   CRL 24.3 mm 9w 1d Hadlock   Cardiac activity: present    bpm   Other: hypoechoic area adjacent to gest sac (.9x18x.7)mm     ROS:  GENERAL: No weight changes. No swelling. No fatigue. No fever. nausea  CARDIOVASCULAR: No chest pain. No shortness of breath. No leg cramps.   NEUROLOGICAL: No headaches. No vision changes.  BREASTS: No pain. No lumps. No discharge.  ABDOMEN: No pain. No diarrhea. No constipation.  REPRODUCTIVE: No abnormal bleeding.   VULVA: No pain. No lesions. No itching.  VAGINA: No relaxation. No itching. No odor. No discharge. No lesions.  URINARY: No incontinence. No nocturia. No frequency. No dysuria.    MEDICATIONS AND ALLERGIES:  Reviewed    COMPREHENSIVE GYN  HISTORY:  PAP History: Denies abnormal Paps.  Infection History: hx hsv Denies PID.  Benign History: Denies uterine fibroids. Denies ovarian cysts. Denies endometriosis. Denies other conditions.  Cancer History: Denies cervical cancer. Denies uterine cancer or hyperplasia. Denies ovarian cancer. Denies vulvar cancer or pre-cancer. Denies vaginal cancer or pre-cancer. Denies breast cancer. Denies colon cancer.  Sexual Activity History: Reports currently being sexually active  Menstrual History: None.  Contraception: None    LMP 06/25/2023 (Approximate)     PE:  AFFECT: Calm, alert and oriented X 3. Interactive during exam  GENERAL: Appears well-nourished, well-developed, in no acute distress.  HEAD: Normocephalic, atruamatic  TEETH: Good dentition.  THYROID: No thyromegally   BREASTS: No masses, skin changes, nipple discharge or adenopathy bilaterally.  SKIN: Normal for race, warm, & dry. No lesions or rashes.  LUNGS: Easy and unlabored, clear to auscultation bilaterally.  HEART: Regular rate and rhythm   ABDOMEN: Soft and nontender without masses or organomegally.  VULVA: No lesions, masses or tenderness.  VAGINA: Moist and well rugated without lesions or discharge.  CERVIX: Moist and pink without lesions, discharge or tenderness.      UTERUS SIZE: 8 week size, nontender and without masses.  ADNEXA: No masses or tenderness.  ESTIMATE OF PELVIC CAPACITY: Adequate  EXTREMITIES: No cyanosis, clubbing or edema. No calf tenderness.  LYMPH NODES: No axillary or inguinal adenopathy.    PROCEDURES:  UPT Positive  Genprobe  Pap    ASSESSMENT/PLAN:  Amenorrhea  Positive urine pregnancy test   -  Routine prenatal care    Nausea and vomiting in pregnancy    -  Education regarding lifestyle and dietary modifications    -  Advised use of B6/Unisom. Pt will notify us if no relief/worsening symptoms, will consider Zofran if needed.    1st TRIMESTER COUNSELING: Discussed all, booklet provided:  Common complaints of pregnancy  HIV  and other routine prenatal tests including  genetic screening  Risk factors identified by prenatal history  Oriented to practice - discussed anticipated course of prenatal care & indications for Ultrasound  Childbirth classes/Hospital facilities   Nutrition and weight gain counseling  Toxoplasmosis precautions (Cats/Raw Meat)  Sexual activity and exercise  Environmental/Work hazards  Travel  Tobacco (Ask, Advise, Assess, Assist, and Arrange), as well as alcohol and drug use  Use of any medications (Including supplements, Vitamins, Herbs, or OTC Drugs)  Domestic violence  Seat belt use    TERATOLOGY COUNSELING:   Discussed indications and options for aneuploidy screening - pamphlets given    -  Pt desires mt21    FOLLOW-UP in 4 weeks with Dr. Brooke  Labs and US today  GC and urine cx pending  Pap updated    Crystal Peña, NP  OB/GYN

## 2023-08-22 LAB
BACTERIA UR CULT: NO GROWTH
C TRACH DNA SPEC QL NAA+PROBE: NOT DETECTED
N GONORRHOEA DNA SPEC QL NAA+PROBE: NOT DETECTED

## 2023-08-24 LAB
HPV HR 12 DNA SPEC QL NAA+PROBE: NEGATIVE
HPV16 AG SPEC QL: NEGATIVE
HPV18 DNA SPEC QL NAA+PROBE: NEGATIVE

## 2023-08-25 LAB
FINAL PATHOLOGIC DIAGNOSIS: NORMAL
Lab: NORMAL

## 2023-09-15 ENCOUNTER — INITIAL PRENATAL (OUTPATIENT)
Dept: OBSTETRICS AND GYNECOLOGY | Facility: CLINIC | Age: 38
End: 2023-09-15
Attending: OBSTETRICS & GYNECOLOGY
Payer: OTHER GOVERNMENT

## 2023-09-15 ENCOUNTER — PATIENT MESSAGE (OUTPATIENT)
Dept: MATERNAL FETAL MEDICINE | Facility: CLINIC | Age: 38
End: 2023-09-15
Payer: OTHER GOVERNMENT

## 2023-09-15 VITALS
SYSTOLIC BLOOD PRESSURE: 114 MMHG | DIASTOLIC BLOOD PRESSURE: 70 MMHG | WEIGHT: 134.94 LBS | BODY MASS INDEX: 21.78 KG/M2

## 2023-09-15 DIAGNOSIS — Z34.90 PREGNANCY WITH ONE FETUS, ANTEPARTUM: Primary | ICD-10-CM

## 2023-09-15 DIAGNOSIS — N20.0 KIDNEY STONES: ICD-10-CM

## 2023-09-15 DIAGNOSIS — O34.219 HISTORY OF CESAREAN DELIVERY, CURRENTLY PREGNANT: ICD-10-CM

## 2023-09-15 PROCEDURE — 99212 OFFICE O/P EST SF 10 MIN: CPT | Mod: PBBFAC | Performed by: OBSTETRICS & GYNECOLOGY

## 2023-09-15 PROCEDURE — 99999 PR PBB SHADOW E&M-EST. PATIENT-LVL II: ICD-10-PCS | Mod: PBBFAC,,, | Performed by: OBSTETRICS & GYNECOLOGY

## 2023-09-15 PROCEDURE — 0502F SUBSEQUENT PRENATAL CARE: CPT | Mod: ,,, | Performed by: OBSTETRICS & GYNECOLOGY

## 2023-09-15 PROCEDURE — 99999 PR PBB SHADOW E&M-EST. PATIENT-LVL II: CPT | Mod: PBBFAC,,, | Performed by: OBSTETRICS & GYNECOLOGY

## 2023-09-15 PROCEDURE — 0502F PR SUBSEQUENT PRENATAL CARE: ICD-10-PCS | Mod: ,,, | Performed by: OBSTETRICS & GYNECOLOGY

## 2023-09-15 RX ORDER — NAPROXEN SODIUM 220 MG/1
81 TABLET, FILM COATED ORAL DAILY
Qty: 90 TABLET | Refills: 3 | Status: ON HOLD | OUTPATIENT
Start: 2023-09-15 | End: 2024-03-22 | Stop reason: HOSPADM

## 2023-09-15 NOTE — PROGRESS NOTES
History of  after pushing for 2 hours, baby was OP. Was tod had prominent sacrum   History of kidney stones, takes citrate and sees urology.  Did labs in  for pre-preg, got preg next cycle.  Interested in MT21, today if possible .  Wart on palm, discussed freezing, or OTC.

## 2023-09-22 ENCOUNTER — PATIENT MESSAGE (OUTPATIENT)
Dept: OBSTETRICS AND GYNECOLOGY | Facility: CLINIC | Age: 38
End: 2023-09-22
Payer: OTHER GOVERNMENT

## 2023-09-27 ENCOUNTER — PATIENT MESSAGE (OUTPATIENT)
Dept: UROLOGY | Facility: CLINIC | Age: 38
End: 2023-09-27
Payer: OTHER GOVERNMENT

## 2023-09-27 RX ORDER — POTASSIUM CITRATE 15 MEQ/1
TABLET, EXTENDED RELEASE ORAL
Qty: 90 TABLET | Refills: 7 | OUTPATIENT
Start: 2023-09-27

## 2023-09-28 ENCOUNTER — PATIENT MESSAGE (OUTPATIENT)
Dept: OBSTETRICS AND GYNECOLOGY | Facility: CLINIC | Age: 38
End: 2023-09-28
Payer: OTHER GOVERNMENT

## 2023-09-28 DIAGNOSIS — N20.0 KIDNEY STONES: Primary | ICD-10-CM

## 2023-09-28 RX ORDER — POTASSIUM CITRATE 15 MEQ/1
TABLET, EXTENDED RELEASE ORAL
Qty: 90 TABLET | Refills: 7 | OUTPATIENT
Start: 2023-09-28

## 2023-09-28 RX ORDER — POTASSIUM CITRATE 15 MEQ/1
15 TABLET, EXTENDED RELEASE ORAL DAILY
Qty: 30 TABLET | Refills: 11 | Status: SHIPPED | OUTPATIENT
Start: 2023-09-28 | End: 2023-10-12 | Stop reason: SDUPTHER

## 2023-09-28 RX ORDER — CALCIUM CARB, CITRATE, MALATE 250 MG
CAPSULE ORAL
Status: CANCELLED | OUTPATIENT
Start: 2023-09-28

## 2023-10-02 ENCOUNTER — TELEPHONE (OUTPATIENT)
Dept: OBSTETRICS AND GYNECOLOGY | Facility: CLINIC | Age: 38
End: 2023-10-02
Payer: OTHER GOVERNMENT

## 2023-10-08 ENCOUNTER — PATIENT MESSAGE (OUTPATIENT)
Dept: OTHER | Facility: OTHER | Age: 38
End: 2023-10-08
Payer: OTHER GOVERNMENT

## 2023-10-12 ENCOUNTER — OFFICE VISIT (OUTPATIENT)
Dept: UROLOGY | Facility: CLINIC | Age: 38
End: 2023-10-12
Payer: OTHER GOVERNMENT

## 2023-10-12 VITALS — WEIGHT: 136.56 LBS | BODY MASS INDEX: 22.04 KG/M2

## 2023-10-12 DIAGNOSIS — Z34.90 PREGNANCY WITH ONE FETUS, ANTEPARTUM: ICD-10-CM

## 2023-10-12 DIAGNOSIS — N20.0 KIDNEY STONES: Primary | ICD-10-CM

## 2023-10-12 PROCEDURE — 99212 OFFICE O/P EST SF 10 MIN: CPT | Mod: PBBFAC | Performed by: STUDENT IN AN ORGANIZED HEALTH CARE EDUCATION/TRAINING PROGRAM

## 2023-10-12 PROCEDURE — 99214 OFFICE O/P EST MOD 30 MIN: CPT | Mod: S$PBB,,, | Performed by: STUDENT IN AN ORGANIZED HEALTH CARE EDUCATION/TRAINING PROGRAM

## 2023-10-12 PROCEDURE — 99999 PR PBB SHADOW E&M-EST. PATIENT-LVL II: ICD-10-PCS | Mod: PBBFAC,,, | Performed by: STUDENT IN AN ORGANIZED HEALTH CARE EDUCATION/TRAINING PROGRAM

## 2023-10-12 PROCEDURE — 99214 PR OFFICE/OUTPT VISIT, EST, LEVL IV, 30-39 MIN: ICD-10-PCS | Mod: S$PBB,,, | Performed by: STUDENT IN AN ORGANIZED HEALTH CARE EDUCATION/TRAINING PROGRAM

## 2023-10-12 PROCEDURE — 99999 PR PBB SHADOW E&M-EST. PATIENT-LVL II: CPT | Mod: PBBFAC,,, | Performed by: STUDENT IN AN ORGANIZED HEALTH CARE EDUCATION/TRAINING PROGRAM

## 2023-10-12 RX ORDER — POTASSIUM CITRATE 15 MEQ/1
15 TABLET, EXTENDED RELEASE ORAL DAILY
Qty: 30 TABLET | Refills: 11 | Status: SHIPPED | OUTPATIENT
Start: 2023-10-12 | End: 2024-02-26

## 2023-10-12 NOTE — PROGRESS NOTES
Patient ID: Bety Cristobal is a 38 y.o. female.    Chief Complaint: Annual Exam    Referral: No referring provider defined for this encounter.     HPI  38 y.o. who presents to the Urology clinic for evaluation of hx of urolithsi. She has been asymptomatic, denies flank pain, hematuria. She has not had a stone event in years. She has known residual stones from prior imaging. Ran out of K citrate previously prescribed.   Medically Necessary ROS documented in HPI    Past Medical History  Active Ambulatory Problems     Diagnosis Date Noted    Kidney stones- takes citrate, hydrates 2021    H/O herpes labialis- oral outbreaks only 2021    Shoulder pain 2023    Amenorrhea 2023    Pregnancy with one fetus, antepartum 09/15/2023    History of  delivery, currently pregnant 09/15/2023     Resolved Ambulatory Problems     Diagnosis Date Noted    No Resolved Ambulatory Problems     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          Past Surgical History  Past Surgical History:   Procedure Laterality Date     SECTION         Social History  Social Connections: Not on file       Medications    Current Outpatient Medications:     aspirin 81 MG Chew, Take 1 tablet (81 mg total) by mouth once daily., Disp: 90 tablet, Rfl: 3    potassium citrate (UROCIT-K 15) 15 mEq TbSR, Take 15 mEq by mouth once daily., Disp: 30 tablet, Rfl: 11    valACYclovir (VALTREX) 500 MG tablet, Take 2 pills twice a day if flare, Disp: 16 tablet, Rfl: 1    COVID-19 vac, sb,Pfizer,,PF, (PFIZER COVID-19) 30 mcg/0.3 mL injection, , Disp: , Rfl:     Allergies  Review of patient's allergies indicates:   Allergen Reactions    Doxycycline Anaphylaxis     esopahgeal spasms       Patient's PMH, FH, Social hx, Medications, allergies reviewed and updated as pertinent to today's visit    Objective:      Physical Exam  Constitutional:       Appearance: She is  well-developed.   HENT:      Head: Normocephalic and atraumatic.   Eyes:      Conjunctiva/sclera: Conjunctivae normal.   Pulmonary:      Effort: Pulmonary effort is normal. No respiratory distress.   Abdominal:      General: There is no distension.      Palpations: Abdomen is soft. There is no mass.      Tenderness: There is no abdominal tenderness. There is no right CVA tenderness, left CVA tenderness or guarding.      Comments: Gravid uterus   Skin:     General: Skin is warm.      Findings: No rash.   Neurological:      Mental Status: She is alert and oriented to person, place, and time.   Psychiatric:         Behavior: Behavior normal.             Assessment:       1. Kidney stones    2. Pregnancy with one fetus, antepartum        Plan:       POCT ua w/o blood  SUZIE due  Citrate Rx resent ( from [facility] to [facility])   If symptomatic from stones in 3rd trimester, safe to treat   29-Jun-2019

## 2023-10-15 ENCOUNTER — PATIENT MESSAGE (OUTPATIENT)
Dept: OTHER | Facility: OTHER | Age: 38
End: 2023-10-15
Payer: OTHER GOVERNMENT

## 2023-10-30 ENCOUNTER — PROCEDURE VISIT (OUTPATIENT)
Dept: MATERNAL FETAL MEDICINE | Facility: CLINIC | Age: 38
End: 2023-10-30
Attending: OBSTETRICS & GYNECOLOGY
Payer: OTHER GOVERNMENT

## 2023-10-30 ENCOUNTER — LAB VISIT (OUTPATIENT)
Dept: LAB | Facility: OTHER | Age: 38
End: 2023-10-30
Attending: OBSTETRICS & GYNECOLOGY
Payer: OTHER GOVERNMENT

## 2023-10-30 ENCOUNTER — ROUTINE PRENATAL (OUTPATIENT)
Dept: OBSTETRICS AND GYNECOLOGY | Facility: CLINIC | Age: 38
End: 2023-10-30
Attending: OBSTETRICS & GYNECOLOGY
Payer: OTHER GOVERNMENT

## 2023-10-30 VITALS — WEIGHT: 141 LBS | SYSTOLIC BLOOD PRESSURE: 114 MMHG | DIASTOLIC BLOOD PRESSURE: 62 MMHG | BODY MASS INDEX: 22.76 KG/M2

## 2023-10-30 DIAGNOSIS — Z34.90 PREGNANCY WITH ONE FETUS, ANTEPARTUM: Primary | ICD-10-CM

## 2023-10-30 DIAGNOSIS — Z34.90 PREGNANCY WITH ONE FETUS, ANTEPARTUM: ICD-10-CM

## 2023-10-30 DIAGNOSIS — O34.219 HISTORY OF CESAREAN DELIVERY, CURRENTLY PREGNANT: ICD-10-CM

## 2023-10-30 DIAGNOSIS — Z34.92 PREGNANCY WITH ONE FETUS IN SECOND TRIMESTER: ICD-10-CM

## 2023-10-30 DIAGNOSIS — Z34.92 PREGNANCY WITH ONE FETUS IN SECOND TRIMESTER: Primary | ICD-10-CM

## 2023-10-30 PROCEDURE — 0502F PR SUBSEQUENT PRENATAL CARE: ICD-10-PCS | Mod: ,,, | Performed by: OBSTETRICS & GYNECOLOGY

## 2023-10-30 PROCEDURE — 36415 COLL VENOUS BLD VENIPUNCTURE: CPT | Performed by: OBSTETRICS & GYNECOLOGY

## 2023-10-30 PROCEDURE — 76811 US MFM PROCEDURE (VIEWPOINT): ICD-10-PCS | Mod: 26,S$PBB,, | Performed by: OBSTETRICS & GYNECOLOGY

## 2023-10-30 PROCEDURE — 0502F SUBSEQUENT PRENATAL CARE: CPT | Mod: ,,, | Performed by: OBSTETRICS & GYNECOLOGY

## 2023-10-30 PROCEDURE — 76811 OB US DETAILED SNGL FETUS: CPT | Mod: PBBFAC | Performed by: OBSTETRICS & GYNECOLOGY

## 2023-10-30 PROCEDURE — 99999 PR PBB SHADOW E&M-EST. PATIENT-LVL II: CPT | Mod: PBBFAC,,, | Performed by: OBSTETRICS & GYNECOLOGY

## 2023-10-30 PROCEDURE — 99212 OFFICE O/P EST SF 10 MIN: CPT | Mod: PBBFAC,PO | Performed by: OBSTETRICS & GYNECOLOGY

## 2023-10-30 PROCEDURE — 99999 PR PBB SHADOW E&M-EST. PATIENT-LVL II: ICD-10-PCS | Mod: PBBFAC,,, | Performed by: OBSTETRICS & GYNECOLOGY

## 2023-10-30 PROCEDURE — 82105 ALPHA-FETOPROTEIN SERUM: CPT | Performed by: OBSTETRICS & GYNECOLOGY

## 2023-10-30 NOTE — PROGRESS NOTES
Some nausea, possible reflux, discussed pepcid prn or nightly.  Feeling some RLP, discussed compression.   Planning for RSV, tdap vaccines, flu today.  Could not breastfeed son due to GI and milk allergies with son.  Glucose screen in 2 visits.  No urinary symptoms at this time, has urology F/U.

## 2023-11-02 ENCOUNTER — HOSPITAL ENCOUNTER (OUTPATIENT)
Dept: RADIOLOGY | Facility: HOSPITAL | Age: 38
Discharge: HOME | End: 2023-11-02
Attending: STUDENT IN AN ORGANIZED HEALTH CARE EDUCATION/TRAINING PROGRAM
Payer: OTHER GOVERNMENT

## 2023-11-02 DIAGNOSIS — N20.0 KIDNEY STONES: ICD-10-CM

## 2023-11-02 LAB
# FETUSES US: NORMAL
AFP INTERPRETATION: NORMAL
AFP MOM SERPL: 1.68
AFP SERPL-MCNC: 91.6 NG/ML
AFP SERPL-MCNC: NEGATIVE NG/ML
AGE AT DELIVERY: 36
GA (DAYS): 1 D
GA (WEEKS): 19 WK
GESTATIONAL AGE METHOD: NORMAL
IDDM PATIENT QL: NORMAL
SMOKING STATUS FTND: NO

## 2023-11-02 PROCEDURE — 76770 US EXAM ABDO BACK WALL COMP: CPT | Mod: 26,,, | Performed by: STUDENT IN AN ORGANIZED HEALTH CARE EDUCATION/TRAINING PROGRAM

## 2023-11-02 PROCEDURE — 76770 US RETROPERITONEAL COMPLETE: ICD-10-PCS | Mod: 26,,, | Performed by: STUDENT IN AN ORGANIZED HEALTH CARE EDUCATION/TRAINING PROGRAM

## 2023-11-02 PROCEDURE — 76857 US EXAM PELVIC LIMITED: CPT | Mod: TC

## 2023-11-02 PROCEDURE — 76857 US BLADDER: ICD-10-PCS | Mod: 26,,, | Performed by: STUDENT IN AN ORGANIZED HEALTH CARE EDUCATION/TRAINING PROGRAM

## 2023-11-02 PROCEDURE — 76770 US EXAM ABDO BACK WALL COMP: CPT | Mod: TC

## 2023-11-02 PROCEDURE — 76857 US EXAM PELVIC LIMITED: CPT | Mod: 26,,, | Performed by: STUDENT IN AN ORGANIZED HEALTH CARE EDUCATION/TRAINING PROGRAM

## 2023-11-05 ENCOUNTER — PATIENT MESSAGE (OUTPATIENT)
Dept: OTHER | Facility: OTHER | Age: 38
End: 2023-11-05
Payer: OTHER GOVERNMENT

## 2023-11-15 ENCOUNTER — ROUTINE PRENATAL (OUTPATIENT)
Dept: OBSTETRICS AND GYNECOLOGY | Facility: CLINIC | Age: 38
End: 2023-11-15
Attending: OBSTETRICS & GYNECOLOGY
Payer: OTHER GOVERNMENT

## 2023-11-15 VITALS
DIASTOLIC BLOOD PRESSURE: 60 MMHG | BODY MASS INDEX: 22.77 KG/M2 | WEIGHT: 141.13 LBS | SYSTOLIC BLOOD PRESSURE: 108 MMHG

## 2023-11-15 DIAGNOSIS — Z34.92 PREGNANCY WITH ONE FETUS IN SECOND TRIMESTER: Primary | ICD-10-CM

## 2023-11-15 DIAGNOSIS — O34.219 HISTORY OF CESAREAN DELIVERY, CURRENTLY PREGNANT: ICD-10-CM

## 2023-11-15 PROCEDURE — 99213 OFFICE O/P EST LOW 20 MIN: CPT | Mod: PBBFAC | Performed by: OBSTETRICS & GYNECOLOGY

## 2023-11-15 PROCEDURE — 0502F SUBSEQUENT PRENATAL CARE: CPT | Mod: ,,, | Performed by: OBSTETRICS & GYNECOLOGY

## 2023-11-15 PROCEDURE — 99999 PR PBB SHADOW E&M-EST. PATIENT-LVL III: ICD-10-PCS | Mod: PBBFAC,,, | Performed by: OBSTETRICS & GYNECOLOGY

## 2023-11-15 PROCEDURE — 0502F PR SUBSEQUENT PRENATAL CARE: ICD-10-PCS | Mod: ,,, | Performed by: OBSTETRICS & GYNECOLOGY

## 2023-11-15 PROCEDURE — 99999 PR PBB SHADOW E&M-EST. PATIENT-LVL III: CPT | Mod: PBBFAC,,, | Performed by: OBSTETRICS & GYNECOLOGY

## 2023-11-15 NOTE — PROGRESS NOTES
Doing well with pepcid and magnesium.  Flying in a couple of weeks to [place] to see family.   Discussed compression stockings and continuing ASA daily.  Discussed delivery options, due to size of last baby, rec 39 week induction.  Breast last preg until baby had GI issues, used specialized formula. Plans to try again.   Precautions reviewed.

## 2023-12-03 ENCOUNTER — PATIENT MESSAGE (OUTPATIENT)
Dept: OTHER | Facility: OTHER | Age: 38
End: 2023-12-03
Payer: OTHER GOVERNMENT

## 2023-12-11 ENCOUNTER — ROUTINE PRENATAL (OUTPATIENT)
Dept: OBSTETRICS AND GYNECOLOGY | Facility: CLINIC | Age: 38
End: 2023-12-11
Payer: OTHER GOVERNMENT

## 2023-12-11 ENCOUNTER — PROCEDURE VISIT (OUTPATIENT)
Dept: MATERNAL FETAL MEDICINE | Facility: CLINIC | Age: 38
End: 2023-12-11
Payer: OTHER GOVERNMENT

## 2023-12-11 ENCOUNTER — LAB VISIT (OUTPATIENT)
Dept: LAB | Facility: OTHER | Age: 38
End: 2023-12-11
Attending: OBSTETRICS & GYNECOLOGY
Payer: OTHER GOVERNMENT

## 2023-12-11 VITALS
SYSTOLIC BLOOD PRESSURE: 112 MMHG | DIASTOLIC BLOOD PRESSURE: 67 MMHG | BODY MASS INDEX: 24.09 KG/M2 | WEIGHT: 149.25 LBS | HEART RATE: 91 BPM

## 2023-12-11 DIAGNOSIS — Z34.90 PREGNANCY WITH ONE FETUS, ANTEPARTUM: ICD-10-CM

## 2023-12-11 DIAGNOSIS — Z36.89 ENCOUNTER FOR ULTRASOUND TO CHECK FETAL GROWTH: Primary | ICD-10-CM

## 2023-12-11 DIAGNOSIS — Z3A.25 25 WEEKS GESTATION OF PREGNANCY: Primary | ICD-10-CM

## 2023-12-11 DIAGNOSIS — Z34.92 PREGNANCY WITH ONE FETUS IN SECOND TRIMESTER: ICD-10-CM

## 2023-12-11 LAB
BASOPHILS # BLD AUTO: 0.05 K/UL (ref 0–0.2)
BASOPHILS NFR BLD: 0.5 % (ref 0–1.9)
DIFFERENTIAL METHOD: ABNORMAL
EOSINOPHIL # BLD AUTO: 0.1 K/UL (ref 0–0.5)
EOSINOPHIL NFR BLD: 0.7 % (ref 0–8)
ERYTHROCYTE [DISTWIDTH] IN BLOOD BY AUTOMATED COUNT: 13.4 % (ref 11.5–14.5)
GLUCOSE SERPL-MCNC: 65 MG/DL (ref 70–140)
HCT VFR BLD AUTO: 33.7 % (ref 37–48.5)
HGB BLD-MCNC: 11.1 G/DL (ref 12–16)
IMM GRANULOCYTES # BLD AUTO: 0.03 K/UL (ref 0–0.04)
IMM GRANULOCYTES NFR BLD AUTO: 0.3 % (ref 0–0.5)
LYMPHOCYTES # BLD AUTO: 2 K/UL (ref 1–4.8)
LYMPHOCYTES NFR BLD: 21.8 % (ref 18–48)
MCH RBC QN AUTO: 30 PG (ref 27–31)
MCHC RBC AUTO-ENTMCNC: 32.9 G/DL (ref 32–36)
MCV RBC AUTO: 91 FL (ref 82–98)
MONOCYTES # BLD AUTO: 0.6 K/UL (ref 0.3–1)
MONOCYTES NFR BLD: 7 % (ref 4–15)
NEUTROPHILS # BLD AUTO: 6.4 K/UL (ref 1.8–7.7)
NEUTROPHILS NFR BLD: 69.7 % (ref 38–73)
NRBC BLD-RTO: 0 /100 WBC
PLATELET # BLD AUTO: 261 K/UL (ref 150–450)
PMV BLD AUTO: 10.1 FL (ref 9.2–12.9)
RBC # BLD AUTO: 3.7 M/UL (ref 4–5.4)
WBC # BLD AUTO: 9.14 K/UL (ref 3.9–12.7)

## 2023-12-11 PROCEDURE — 76816 OB US FOLLOW-UP PER FETUS: CPT | Mod: PBBFAC | Performed by: STUDENT IN AN ORGANIZED HEALTH CARE EDUCATION/TRAINING PROGRAM

## 2023-12-11 PROCEDURE — 99999 PR PBB SHADOW E&M-EST. PATIENT-LVL III: ICD-10-PCS | Mod: PBBFAC,,, | Performed by: NURSE PRACTITIONER

## 2023-12-11 PROCEDURE — 0502F PR SUBSEQUENT PRENATAL CARE: ICD-10-PCS | Mod: ,,, | Performed by: NURSE PRACTITIONER

## 2023-12-11 PROCEDURE — 85025 COMPLETE CBC W/AUTO DIFF WBC: CPT | Performed by: OBSTETRICS & GYNECOLOGY

## 2023-12-11 PROCEDURE — 99213 OFFICE O/P EST LOW 20 MIN: CPT | Mod: PBBFAC | Performed by: NURSE PRACTITIONER

## 2023-12-11 PROCEDURE — 82950 GLUCOSE TEST: CPT | Performed by: OBSTETRICS & GYNECOLOGY

## 2023-12-11 PROCEDURE — 76816 US MFM PROCEDURE (VIEWPOINT): ICD-10-PCS | Mod: 26,S$PBB,, | Performed by: STUDENT IN AN ORGANIZED HEALTH CARE EDUCATION/TRAINING PROGRAM

## 2023-12-11 PROCEDURE — 0502F SUBSEQUENT PRENATAL CARE: CPT | Mod: ,,, | Performed by: NURSE PRACTITIONER

## 2023-12-11 PROCEDURE — 99999 PR PBB SHADOW E&M-EST. PATIENT-LVL III: CPT | Mod: PBBFAC,,, | Performed by: NURSE PRACTITIONER

## 2023-12-11 PROCEDURE — 36415 COLL VENOUS BLD VENIPUNCTURE: CPT | Performed by: OBSTETRICS & GYNECOLOGY

## 2023-12-11 NOTE — PROGRESS NOTES
Here for routine OB appt at 25w1d, with no complaints.  Denies VB and cramping.  Pain, bleeding, and PTL precautions given.  F/U scheduled 4 weeks  Passed glucose, normal cbc  Rh pos- does not need rhogam  Discussed tdap- will do next visit

## 2023-12-13 ENCOUNTER — PATIENT MESSAGE (OUTPATIENT)
Dept: OBSTETRICS AND GYNECOLOGY | Facility: CLINIC | Age: 38
End: 2023-12-13
Payer: OTHER GOVERNMENT

## 2023-12-17 ENCOUNTER — PATIENT MESSAGE (OUTPATIENT)
Dept: OTHER | Facility: OTHER | Age: 38
End: 2023-12-17
Payer: OTHER GOVERNMENT

## 2023-12-31 ENCOUNTER — PATIENT MESSAGE (OUTPATIENT)
Dept: OTHER | Facility: OTHER | Age: 38
End: 2023-12-31
Payer: OTHER GOVERNMENT

## 2024-01-10 ENCOUNTER — ROUTINE PRENATAL (OUTPATIENT)
Dept: OBSTETRICS AND GYNECOLOGY | Facility: CLINIC | Age: 39
End: 2024-01-10
Attending: OBSTETRICS & GYNECOLOGY
Payer: OTHER GOVERNMENT

## 2024-01-10 ENCOUNTER — PATIENT MESSAGE (OUTPATIENT)
Dept: OBSTETRICS AND GYNECOLOGY | Facility: CLINIC | Age: 39
End: 2024-01-10

## 2024-01-10 VITALS
DIASTOLIC BLOOD PRESSURE: 70 MMHG | WEIGHT: 163.13 LBS | SYSTOLIC BLOOD PRESSURE: 110 MMHG | BODY MASS INDEX: 26.33 KG/M2

## 2024-01-10 DIAGNOSIS — O34.219 HISTORY OF CESAREAN DELIVERY, CURRENTLY PREGNANT: Primary | ICD-10-CM

## 2024-01-10 DIAGNOSIS — Z34.90 PREGNANCY WITH ONE FETUS, ANTEPARTUM: ICD-10-CM

## 2024-01-10 DIAGNOSIS — R09.81 NASAL CONGESTION: ICD-10-CM

## 2024-01-10 DIAGNOSIS — Z86.19 H/O HERPES LABIALIS: ICD-10-CM

## 2024-01-10 PROCEDURE — 99999 PR PBB SHADOW E&M-EST. PATIENT-LVL III: CPT | Mod: PBBFAC,,, | Performed by: OBSTETRICS & GYNECOLOGY

## 2024-01-10 PROCEDURE — 99213 OFFICE O/P EST LOW 20 MIN: CPT | Mod: PBBFAC | Performed by: OBSTETRICS & GYNECOLOGY

## 2024-01-10 PROCEDURE — 0502F SUBSEQUENT PRENATAL CARE: CPT | Mod: ,,, | Performed by: OBSTETRICS & GYNECOLOGY

## 2024-01-10 RX ORDER — FLUTICASONE PROPIONATE 50 MCG
1 SPRAY, SUSPENSION (ML) NASAL DAILY
Qty: 15.8 ML | Refills: 0 | Status: SHIPPED | OUTPATIENT
Start: 2024-01-10 | End: 2024-02-26

## 2024-01-10 NOTE — PROGRESS NOTES
Normal urology scans.  Discussed possible delivery scenarios.  Would like to schedule c/s for EDC, if labor before may try for TOLAC.  Still considering options.  Sleep is OK, congestion is still an issue, flonase and saline rinses discussed.  Still taking magnesium, miralax as needed.  Pepcid is helping.  Third tri precautions reviewed.

## 2024-01-28 ENCOUNTER — PATIENT MESSAGE (OUTPATIENT)
Dept: OTHER | Facility: OTHER | Age: 39
End: 2024-01-28
Payer: OTHER GOVERNMENT

## 2024-01-31 ENCOUNTER — PROCEDURE VISIT (OUTPATIENT)
Dept: MATERNAL FETAL MEDICINE | Facility: CLINIC | Age: 39
End: 2024-01-31
Attending: OBSTETRICS & GYNECOLOGY
Payer: OTHER GOVERNMENT

## 2024-01-31 DIAGNOSIS — Z36.89 ENCOUNTER FOR ULTRASOUND TO CHECK FETAL GROWTH: ICD-10-CM

## 2024-01-31 PROCEDURE — 76816 OB US FOLLOW-UP PER FETUS: CPT | Mod: PBBFAC | Performed by: OBSTETRICS & GYNECOLOGY

## 2024-01-31 PROCEDURE — 76817 TRANSVAGINAL US OBSTETRIC: CPT | Mod: 26,S$PBB,, | Performed by: OBSTETRICS & GYNECOLOGY

## 2024-02-05 ENCOUNTER — ROUTINE PRENATAL (OUTPATIENT)
Dept: OBSTETRICS AND GYNECOLOGY | Facility: CLINIC | Age: 39
End: 2024-02-05
Attending: OBSTETRICS & GYNECOLOGY
Payer: OTHER GOVERNMENT

## 2024-02-05 VITALS
WEIGHT: 164.69 LBS | SYSTOLIC BLOOD PRESSURE: 109 MMHG | HEART RATE: 94 BPM | DIASTOLIC BLOOD PRESSURE: 53 MMHG | BODY MASS INDEX: 26.58 KG/M2

## 2024-02-05 DIAGNOSIS — Z98.891 HISTORY OF CESAREAN DELIVERY: Primary | ICD-10-CM

## 2024-02-05 PROCEDURE — 99999 PR PBB SHADOW E&M-EST. PATIENT-LVL IV: CPT | Mod: PBBFAC,,, | Performed by: OBSTETRICS & GYNECOLOGY

## 2024-02-05 PROCEDURE — 99214 OFFICE O/P EST MOD 30 MIN: CPT | Mod: PBBFAC,PO | Performed by: OBSTETRICS & GYNECOLOGY

## 2024-02-05 PROCEDURE — 0502F SUBSEQUENT PRENATAL CARE: CPT | Mod: ,,, | Performed by: OBSTETRICS & GYNECOLOGY

## 2024-02-05 NOTE — PROGRESS NOTES
Left pinky toe numbness with pressure along peroneal nerve, discussed padding with sleep.  Patient seen and examined.  denies VB/LOF/Ctxns, reports good fetal movement. Precautions for Bleeding/ ROM/ Decreased fetal movement/ Pre-E reviewed.  Repeat  scheduled for 3/19 at 7 am.  RSV today  F/U scheduled 1 weeks

## 2024-02-10 ENCOUNTER — PATIENT MESSAGE (OUTPATIENT)
Dept: OBSTETRICS AND GYNECOLOGY | Facility: CLINIC | Age: 39
End: 2024-02-10
Payer: OTHER GOVERNMENT

## 2024-02-18 ENCOUNTER — PATIENT MESSAGE (OUTPATIENT)
Dept: OTHER | Facility: OTHER | Age: 39
End: 2024-02-18
Payer: OTHER GOVERNMENT

## 2024-02-21 ENCOUNTER — CLINICAL SUPPORT (OUTPATIENT)
Dept: OBSTETRICS AND GYNECOLOGY | Facility: CLINIC | Age: 39
End: 2024-02-21
Attending: OBSTETRICS & GYNECOLOGY
Payer: OTHER GOVERNMENT

## 2024-02-21 ENCOUNTER — LAB VISIT (OUTPATIENT)
Dept: LAB | Facility: OTHER | Age: 39
End: 2024-02-21
Attending: OBSTETRICS & GYNECOLOGY
Payer: OTHER GOVERNMENT

## 2024-02-21 VITALS — BODY MASS INDEX: 27.28 KG/M2 | SYSTOLIC BLOOD PRESSURE: 105 MMHG | WEIGHT: 169 LBS | DIASTOLIC BLOOD PRESSURE: 74 MMHG

## 2024-02-21 DIAGNOSIS — Z23 NEED FOR TDAP VACCINATION: Primary | ICD-10-CM

## 2024-02-21 DIAGNOSIS — O34.219 HISTORY OF CESAREAN DELIVERY, CURRENTLY PREGNANT: ICD-10-CM

## 2024-02-21 DIAGNOSIS — J06.9 UPPER RESPIRATORY TRACT INFECTION, UNSPECIFIED TYPE: Primary | ICD-10-CM

## 2024-02-21 DIAGNOSIS — Z34.90 PREGNANCY WITH ONE FETUS, ANTEPARTUM: ICD-10-CM

## 2024-02-21 LAB
BASOPHILS # BLD AUTO: 0.04 K/UL (ref 0–0.2)
BASOPHILS NFR BLD: 0.3 % (ref 0–1.9)
DIFFERENTIAL METHOD BLD: ABNORMAL
EOSINOPHIL # BLD AUTO: 0.1 K/UL (ref 0–0.5)
EOSINOPHIL NFR BLD: 1 % (ref 0–8)
ERYTHROCYTE [DISTWIDTH] IN BLOOD BY AUTOMATED COUNT: 13.1 % (ref 11.5–14.5)
HCT VFR BLD AUTO: 32.9 % (ref 37–48.5)
HGB BLD-MCNC: 10.9 G/DL (ref 12–16)
HIV 1+2 AB+HIV1 P24 AG SERPL QL IA: NORMAL
IMM GRANULOCYTES # BLD AUTO: 0.06 K/UL (ref 0–0.04)
IMM GRANULOCYTES NFR BLD AUTO: 0.5 % (ref 0–0.5)
LYMPHOCYTES # BLD AUTO: 2.2 K/UL (ref 1–4.8)
LYMPHOCYTES NFR BLD: 18.3 % (ref 18–48)
MCH RBC QN AUTO: 29.7 PG (ref 27–31)
MCHC RBC AUTO-ENTMCNC: 33.1 G/DL (ref 32–36)
MCV RBC AUTO: 90 FL (ref 82–98)
MONOCYTES # BLD AUTO: 0.8 K/UL (ref 0.3–1)
MONOCYTES NFR BLD: 6.7 % (ref 4–15)
NEUTROPHILS # BLD AUTO: 8.6 K/UL (ref 1.8–7.7)
NEUTROPHILS NFR BLD: 73.2 % (ref 38–73)
NRBC BLD-RTO: 0 /100 WBC
PLATELET # BLD AUTO: 276 K/UL (ref 150–450)
PMV BLD AUTO: 10.3 FL (ref 9.2–12.9)
RBC # BLD AUTO: 3.67 M/UL (ref 4–5.4)
RPR SER QL: NORMAL
WBC # BLD AUTO: 11.75 K/UL (ref 3.9–12.7)

## 2024-02-21 PROCEDURE — 36415 COLL VENOUS BLD VENIPUNCTURE: CPT | Performed by: OBSTETRICS & GYNECOLOGY

## 2024-02-21 PROCEDURE — 87389 HIV-1 AG W/HIV-1&-2 AB AG IA: CPT | Performed by: OBSTETRICS & GYNECOLOGY

## 2024-02-21 PROCEDURE — 86592 SYPHILIS TEST NON-TREP QUAL: CPT | Performed by: OBSTETRICS & GYNECOLOGY

## 2024-02-21 PROCEDURE — 87081 CULTURE SCREEN ONLY: CPT | Performed by: OBSTETRICS & GYNECOLOGY

## 2024-02-21 PROCEDURE — 85025 COMPLETE CBC W/AUTO DIFF WBC: CPT | Performed by: OBSTETRICS & GYNECOLOGY

## 2024-02-21 PROCEDURE — 0502F SUBSEQUENT PRENATAL CARE: CPT | Mod: ,,, | Performed by: OBSTETRICS & GYNECOLOGY

## 2024-02-21 PROCEDURE — 99999PBSHW TDAP VACCINE GREATER THAN OR EQUAL TO 7YO IM: Mod: PBBFAC,,,

## 2024-02-21 PROCEDURE — 90471 IMMUNIZATION ADMIN: CPT | Mod: PBBFAC

## 2024-02-21 PROCEDURE — 99999 PR PBB SHADOW E&M-EST. PATIENT-LVL III: CPT | Mod: PBBFAC,,, | Performed by: OBSTETRICS & GYNECOLOGY

## 2024-02-21 PROCEDURE — 99211 OFF/OP EST MAY X REQ PHY/QHP: CPT | Mod: PBBFAC,27

## 2024-02-21 PROCEDURE — 99213 OFFICE O/P EST LOW 20 MIN: CPT | Mod: PBBFAC,25 | Performed by: OBSTETRICS & GYNECOLOGY

## 2024-02-21 PROCEDURE — 99999 PR PBB SHADOW E&M-EST. PATIENT-LVL I: CPT | Mod: PBBFAC,,,

## 2024-02-21 RX ORDER — BENZONATATE 100 MG/1
100 CAPSULE ORAL EVERY 6 HOURS PRN
Qty: 30 CAPSULE | Refills: 1 | Status: ON HOLD | OUTPATIENT
Start: 2024-02-21 | End: 2024-03-22 | Stop reason: HOSPADM

## 2024-02-21 NOTE — PROGRESS NOTES
Patient seen and examined.    Cough improving, tessalon perles sent.   No complaints, denies VB/LOF/Ctxns, reports good fetal movement. Precautions for Bleeding/ ROM/ Decreased fetal movement/ Pre-E reviewed.  GBS and t3 labs today.  F/U scheduled 1 weeks

## 2024-02-24 LAB — BACTERIA SPEC AEROBE CULT: NORMAL

## 2024-02-26 ENCOUNTER — ROUTINE PRENATAL (OUTPATIENT)
Dept: OBSTETRICS AND GYNECOLOGY | Facility: CLINIC | Age: 39
End: 2024-02-26
Payer: OTHER GOVERNMENT

## 2024-02-26 VITALS
WEIGHT: 172.38 LBS | DIASTOLIC BLOOD PRESSURE: 69 MMHG | SYSTOLIC BLOOD PRESSURE: 115 MMHG | BODY MASS INDEX: 27.83 KG/M2

## 2024-02-26 DIAGNOSIS — Z3A.36 36 WEEKS GESTATION OF PREGNANCY: Primary | ICD-10-CM

## 2024-02-26 PROCEDURE — 0502F SUBSEQUENT PRENATAL CARE: CPT | Mod: ,,, | Performed by: NURSE PRACTITIONER

## 2024-02-26 PROCEDURE — 99999 PR PBB SHADOW E&M-EST. PATIENT-LVL III: CPT | Mod: PBBFAC,,, | Performed by: NURSE PRACTITIONER

## 2024-02-26 PROCEDURE — 99213 OFFICE O/P EST LOW 20 MIN: CPT | Mod: PBBFAC | Performed by: NURSE PRACTITIONER

## 2024-02-26 NOTE — PROGRESS NOTES
Here for routine OB appt at 36w1d, with no complaints.  Reports good FM.  Denies LOF, denies VB, denies contractions.  Reviewed warning signs of Labor and Preeclampsia.  Daily FM counts reinforced.  Received tdap  GBS negative  RCS scheduled for 3/19, open to trying a  pending baby's size and if she goes into spontaneous labor or not. Pushed several hours with first baby and he was over 8#

## 2024-02-28 ENCOUNTER — PROCEDURE VISIT (OUTPATIENT)
Dept: MATERNAL FETAL MEDICINE | Facility: CLINIC | Age: 39
End: 2024-02-28
Payer: OTHER GOVERNMENT

## 2024-02-28 DIAGNOSIS — Z3A.36 36 WEEKS GESTATION OF PREGNANCY: ICD-10-CM

## 2024-02-28 PROCEDURE — 76816 OB US FOLLOW-UP PER FETUS: CPT | Mod: PBBFAC | Performed by: OBSTETRICS & GYNECOLOGY

## 2024-03-04 ENCOUNTER — ROUTINE PRENATAL (OUTPATIENT)
Dept: OBSTETRICS AND GYNECOLOGY | Facility: CLINIC | Age: 39
End: 2024-03-04
Attending: OBSTETRICS & GYNECOLOGY
Payer: OTHER GOVERNMENT

## 2024-03-04 VITALS — WEIGHT: 174.5 LBS | SYSTOLIC BLOOD PRESSURE: 104 MMHG | DIASTOLIC BLOOD PRESSURE: 64 MMHG | BODY MASS INDEX: 28.17 KG/M2

## 2024-03-04 DIAGNOSIS — O34.219 HISTORY OF CESAREAN DELIVERY, CURRENTLY PREGNANT: Primary | ICD-10-CM

## 2024-03-04 DIAGNOSIS — Z34.90 PREGNANCY WITH ONE FETUS, ANTEPARTUM: ICD-10-CM

## 2024-03-04 PROCEDURE — 0502F SUBSEQUENT PRENATAL CARE: CPT | Mod: ,,, | Performed by: OBSTETRICS & GYNECOLOGY

## 2024-03-04 PROCEDURE — 99999 PR PBB SHADOW E&M-EST. PATIENT-LVL II: CPT | Mod: PBBFAC,,, | Performed by: OBSTETRICS & GYNECOLOGY

## 2024-03-04 PROCEDURE — 99212 OFFICE O/P EST SF 10 MIN: CPT | Mod: PBBFAC,PO | Performed by: OBSTETRICS & GYNECOLOGY

## 2024-03-04 NOTE — PROGRESS NOTES
Patient seen and examined.  No complaints, denies VB/LOF/Ctxns, reports good fetal movement. Precautions for Bleeding/ ROM/ Decreased fetal movement/ Pre-E reviewed.  Consents signed today.  F/U scheduled 1 weeks

## 2024-03-11 ENCOUNTER — ROUTINE PRENATAL (OUTPATIENT)
Dept: OBSTETRICS AND GYNECOLOGY | Facility: CLINIC | Age: 39
End: 2024-03-11
Attending: OBSTETRICS & GYNECOLOGY
Payer: OTHER GOVERNMENT

## 2024-03-11 VITALS
BODY MASS INDEX: 28.47 KG/M2 | WEIGHT: 176.38 LBS | SYSTOLIC BLOOD PRESSURE: 120 MMHG | DIASTOLIC BLOOD PRESSURE: 68 MMHG

## 2024-03-11 DIAGNOSIS — O34.219 HISTORY OF CESAREAN DELIVERY, CURRENTLY PREGNANT: Primary | ICD-10-CM

## 2024-03-11 PROCEDURE — 0502F SUBSEQUENT PRENATAL CARE: CPT | Mod: ,,, | Performed by: OBSTETRICS & GYNECOLOGY

## 2024-03-11 PROCEDURE — 99213 OFFICE O/P EST LOW 20 MIN: CPT | Mod: PBBFAC,PO | Performed by: OBSTETRICS & GYNECOLOGY

## 2024-03-11 PROCEDURE — 99999 PR PBB SHADOW E&M-EST. PATIENT-LVL III: CPT | Mod: PBBFAC,,, | Performed by: OBSTETRICS & GYNECOLOGY

## 2024-03-19 ENCOUNTER — HOSPITAL ENCOUNTER (INPATIENT)
Facility: OTHER | Age: 39
LOS: 3 days | Discharge: HOME | End: 2024-03-22
Attending: OBSTETRICS & GYNECOLOGY | Admitting: OBSTETRICS & GYNECOLOGY
Payer: OTHER GOVERNMENT

## 2024-03-19 ENCOUNTER — ANESTHESIA EVENT (OUTPATIENT)
Dept: OBSTETRICS AND GYNECOLOGY | Facility: OTHER | Age: 39
End: 2024-03-19
Payer: OTHER GOVERNMENT

## 2024-03-19 DIAGNOSIS — Z98.891 HISTORY OF C-SECTION: ICD-10-CM

## 2024-03-19 DIAGNOSIS — Z98.891 S/P CESAREAN SECTION: Primary | ICD-10-CM

## 2024-03-19 DIAGNOSIS — O34.219 HISTORY OF CESAREAN DELIVERY, CURRENTLY PREGNANT: ICD-10-CM

## 2024-03-19 LAB
ABO + RH BLD: NORMAL
ALBUMIN SERPL BCP-MCNC: 2.6 G/DL (ref 3.5–5.2)
ALP SERPL-CCNC: 91 U/L (ref 55–135)
ALT SERPL W/O P-5'-P-CCNC: 13 U/L (ref 10–44)
ANION GAP SERPL CALC-SCNC: 8 MMOL/L (ref 8–16)
APTT PPP: 31.5 SEC (ref 21–32)
APTT PPP: 39.5 SEC (ref 21–32)
AST SERPL-CCNC: 27 U/L (ref 10–40)
BASOPHILS # BLD AUTO: 0.04 K/UL (ref 0–0.2)
BASOPHILS # BLD AUTO: 0.04 K/UL (ref 0–0.2)
BASOPHILS # BLD AUTO: 0.09 K/UL (ref 0–0.2)
BASOPHILS NFR BLD: 0.2 % (ref 0–1.9)
BASOPHILS NFR BLD: 0.3 % (ref 0–1.9)
BASOPHILS NFR BLD: 0.3 % (ref 0–1.9)
BILIRUB SERPL-MCNC: 1 MG/DL (ref 0.1–1)
BLD GP AB SCN CELLS X3 SERPL QL: NORMAL
BLD PROD TYP BPU: NORMAL
BLD PROD TYP BPU: NORMAL
BLOOD UNIT EXPIRATION DATE: NORMAL
BLOOD UNIT EXPIRATION DATE: NORMAL
BLOOD UNIT TYPE CODE: 5100
BLOOD UNIT TYPE CODE: 9500
BLOOD UNIT TYPE: NORMAL
BLOOD UNIT TYPE: NORMAL
BUN SERPL-MCNC: 10 MG/DL (ref 6–20)
CALCIUM SERPL-MCNC: 8.4 MG/DL (ref 8.7–10.5)
CHLORIDE SERPL-SCNC: 106 MMOL/L (ref 95–110)
CO2 SERPL-SCNC: 21 MMOL/L (ref 23–29)
CODING SYSTEM: NORMAL
CODING SYSTEM: NORMAL
CREAT SERPL-MCNC: 0.7 MG/DL (ref 0.5–1.4)
CROSSMATCH INTERPRETATION: NORMAL
CROSSMATCH INTERPRETATION: NORMAL
DIFFERENTIAL METHOD BLD: ABNORMAL
DISPENSE STATUS: NORMAL
DISPENSE STATUS: NORMAL
EOSINOPHIL # BLD AUTO: 0 K/UL (ref 0–0.5)
EOSINOPHIL # BLD AUTO: 0 K/UL (ref 0–0.5)
EOSINOPHIL # BLD AUTO: 0.1 K/UL (ref 0–0.5)
EOSINOPHIL NFR BLD: 0 % (ref 0–8)
EOSINOPHIL NFR BLD: 0.1 % (ref 0–8)
EOSINOPHIL NFR BLD: 0.9 % (ref 0–8)
ERYTHROCYTE [DISTWIDTH] IN BLOOD BY AUTOMATED COUNT: 13.5 % (ref 11.5–14.5)
ERYTHROCYTE [DISTWIDTH] IN BLOOD BY AUTOMATED COUNT: 13.6 % (ref 11.5–14.5)
ERYTHROCYTE [DISTWIDTH] IN BLOOD BY AUTOMATED COUNT: 13.9 % (ref 11.5–14.5)
EST. GFR  (NO RACE VARIABLE): >60 ML/MIN/1.73 M^2
FIBRINOGEN PPP-MCNC: 225 MG/DL (ref 182–400)
FIBRINOGEN PPP-MCNC: 242 MG/DL (ref 182–400)
GLUCOSE SERPL-MCNC: 107 MG/DL (ref 70–110)
HCT VFR BLD AUTO: 25.5 % (ref 37–48.5)
HCT VFR BLD AUTO: 28.6 % (ref 37–48.5)
HCT VFR BLD AUTO: 33.7 % (ref 37–48.5)
HGB BLD-MCNC: 11 G/DL (ref 12–16)
HGB BLD-MCNC: 8.6 G/DL (ref 12–16)
HGB BLD-MCNC: 9.5 G/DL (ref 12–16)
IMM GRANULOCYTES # BLD AUTO: 0.05 K/UL (ref 0–0.04)
IMM GRANULOCYTES # BLD AUTO: 0.12 K/UL (ref 0–0.04)
IMM GRANULOCYTES # BLD AUTO: 0.23 K/UL (ref 0–0.04)
IMM GRANULOCYTES NFR BLD AUTO: 0.4 % (ref 0–0.5)
IMM GRANULOCYTES NFR BLD AUTO: 0.5 % (ref 0–0.5)
IMM GRANULOCYTES NFR BLD AUTO: 0.7 % (ref 0–0.5)
INFLUENZA A, MOLECULAR: NEGATIVE
INFLUENZA B, MOLECULAR: NEGATIVE
INR PPP: 1 (ref 0.8–1.2)
INR PPP: 1.1 (ref 0.8–1.2)
LYMPHOCYTES # BLD AUTO: 1.2 K/UL (ref 1–4.8)
LYMPHOCYTES # BLD AUTO: 1.5 K/UL (ref 1–4.8)
LYMPHOCYTES # BLD AUTO: 3 K/UL (ref 1–4.8)
LYMPHOCYTES NFR BLD: 24.1 % (ref 18–48)
LYMPHOCYTES NFR BLD: 4.4 % (ref 18–48)
LYMPHOCYTES NFR BLD: 5.1 % (ref 18–48)
MCH RBC QN AUTO: 29.3 PG (ref 27–31)
MCH RBC QN AUTO: 29.7 PG (ref 27–31)
MCH RBC QN AUTO: 29.8 PG (ref 27–31)
MCHC RBC AUTO-ENTMCNC: 32.6 G/DL (ref 32–36)
MCHC RBC AUTO-ENTMCNC: 33.2 G/DL (ref 32–36)
MCHC RBC AUTO-ENTMCNC: 33.7 G/DL (ref 32–36)
MCV RBC AUTO: 88 FL (ref 82–98)
MCV RBC AUTO: 90 FL (ref 82–98)
MCV RBC AUTO: 90 FL (ref 82–98)
MONOCYTES # BLD AUTO: 0.8 K/UL (ref 0.3–1)
MONOCYTES # BLD AUTO: 0.8 K/UL (ref 0.3–1)
MONOCYTES # BLD AUTO: 1.5 K/UL (ref 0.3–1)
MONOCYTES NFR BLD: 2.4 % (ref 4–15)
MONOCYTES NFR BLD: 6 % (ref 4–15)
MONOCYTES NFR BLD: 6.2 % (ref 4–15)
NEUTROPHILS # BLD AUTO: 21.5 K/UL (ref 1.8–7.7)
NEUTROPHILS # BLD AUTO: 30.3 K/UL (ref 1.8–7.7)
NEUTROPHILS # BLD AUTO: 8.4 K/UL (ref 1.8–7.7)
NEUTROPHILS NFR BLD: 68.1 % (ref 38–73)
NEUTROPHILS NFR BLD: 88.2 % (ref 38–73)
NEUTROPHILS NFR BLD: 92.1 % (ref 38–73)
NRBC BLD-RTO: 0 /100 WBC
NUM UNITS TRANS FFP: NORMAL
NUM UNITS TRANS FFP: NORMAL
PLATELET # BLD AUTO: 218 K/UL (ref 150–450)
PLATELET # BLD AUTO: 219 K/UL (ref 150–450)
PLATELET # BLD AUTO: 240 K/UL (ref 150–450)
PLATELET BLD QL SMEAR: ABNORMAL
PLATELET BLD QL SMEAR: ABNORMAL
PMV BLD AUTO: 10 FL (ref 9.2–12.9)
PMV BLD AUTO: 10.1 FL (ref 9.2–12.9)
PMV BLD AUTO: 10.4 FL (ref 9.2–12.9)
POTASSIUM SERPL-SCNC: 4 MMOL/L (ref 3.5–5.1)
PROT SERPL-MCNC: 5.1 G/DL (ref 6–8.4)
PROTHROMBIN TIME: 10.9 SEC (ref 9–12.5)
PROTHROMBIN TIME: 11.9 SEC (ref 9–12.5)
RBC # BLD AUTO: 2.9 M/UL (ref 4–5.4)
RBC # BLD AUTO: 3.19 M/UL (ref 4–5.4)
RBC # BLD AUTO: 3.75 M/UL (ref 4–5.4)
SARS-COV-2 RDRP RESP QL NAA+PROBE: NEGATIVE
SODIUM SERPL-SCNC: 135 MMOL/L (ref 136–145)
SPECIMEN OUTDATE: NORMAL
SPECIMEN SOURCE: NORMAL
WBC # BLD AUTO: 12.37 K/UL (ref 3.9–12.7)
WBC # BLD AUTO: 24.34 K/UL (ref 3.9–12.7)
WBC # BLD AUTO: 32.89 K/UL (ref 3.9–12.7)

## 2024-03-19 PROCEDURE — 80053 COMPREHEN METABOLIC PANEL: CPT

## 2024-03-19 PROCEDURE — 87502 INFLUENZA DNA AMP PROBE: CPT | Performed by: STUDENT IN AN ORGANIZED HEALTH CARE EDUCATION/TRAINING PROGRAM

## 2024-03-19 PROCEDURE — 86920 COMPATIBILITY TEST SPIN: CPT | Performed by: ANESTHESIOLOGY

## 2024-03-19 PROCEDURE — P9016 RBC LEUKOCYTES REDUCED: HCPCS | Performed by: ANESTHESIOLOGY

## 2024-03-19 PROCEDURE — 30233N1 TRANSFUSION OF NONAUTOLOGOUS RED BLOOD CELLS INTO PERIPHERAL VEIN, PERCUTANEOUS APPROACH: ICD-10-PCS | Performed by: OBSTETRICS & GYNECOLOGY

## 2024-03-19 PROCEDURE — 37000009 HC ANESTHESIA EA ADD 15 MINS: Performed by: OBSTETRICS & GYNECOLOGY

## 2024-03-19 PROCEDURE — 36415 COLL VENOUS BLD VENIPUNCTURE: CPT

## 2024-03-19 PROCEDURE — 59899 UNLISTED PX MAT CARE&DLVR: CPT | Mod: S$PBB,,, | Performed by: OBSTETRICS & GYNECOLOGY

## 2024-03-19 PROCEDURE — U0002 COVID-19 LAB TEST NON-CDC: HCPCS

## 2024-03-19 PROCEDURE — 25000003 PHARM REV CODE 250

## 2024-03-19 PROCEDURE — P9017 PLASMA 1 DONOR FRZ W/IN 8 HR: HCPCS | Performed by: OBSTETRICS & GYNECOLOGY

## 2024-03-19 PROCEDURE — 63600175 PHARM REV CODE 636 W HCPCS

## 2024-03-19 PROCEDURE — 85730 THROMBOPLASTIN TIME PARTIAL: CPT | Performed by: OBSTETRICS & GYNECOLOGY

## 2024-03-19 PROCEDURE — 71000039 HC RECOVERY, EACH ADD'L HOUR: Performed by: OBSTETRICS & GYNECOLOGY

## 2024-03-19 PROCEDURE — 86901 BLOOD TYPING SEROLOGIC RH(D): CPT | Performed by: OBSTETRICS & GYNECOLOGY

## 2024-03-19 PROCEDURE — 85730 THROMBOPLASTIN TIME PARTIAL: CPT | Mod: 91

## 2024-03-19 PROCEDURE — 59510 CESAREAN DELIVERY: CPT | Mod: AA,,, | Performed by: ANESTHESIOLOGY

## 2024-03-19 PROCEDURE — 85384 FIBRINOGEN ACTIVITY: CPT | Mod: 91

## 2024-03-19 PROCEDURE — 85610 PROTHROMBIN TIME: CPT | Mod: 91

## 2024-03-19 PROCEDURE — 85025 COMPLETE CBC W/AUTO DIFF WBC: CPT | Mod: 91

## 2024-03-19 PROCEDURE — 85610 PROTHROMBIN TIME: CPT | Performed by: OBSTETRICS & GYNECOLOGY

## 2024-03-19 PROCEDURE — 36004725 HC OB OR TIME LEV III - EA ADD 15 MIN: Performed by: OBSTETRICS & GYNECOLOGY

## 2024-03-19 PROCEDURE — 85025 COMPLETE CBC W/AUTO DIFF WBC: CPT | Performed by: OBSTETRICS & GYNECOLOGY

## 2024-03-19 PROCEDURE — 27202775 HC JADA POST PARTUM HEMOR (PPH) SYSTEM

## 2024-03-19 PROCEDURE — 11000001 HC ACUTE MED/SURG PRIVATE ROOM

## 2024-03-19 PROCEDURE — 51702 INSERT TEMP BLADDER CATH: CPT

## 2024-03-19 PROCEDURE — 0W3R7ZZ CONTROL BLEEDING IN GENITOURINARY TRACT, VIA NATURAL OR ARTIFICIAL OPENING: ICD-10-PCS | Performed by: OBSTETRICS & GYNECOLOGY

## 2024-03-19 PROCEDURE — 85384 FIBRINOGEN ACTIVITY: CPT | Performed by: OBSTETRICS & GYNECOLOGY

## 2024-03-19 PROCEDURE — 36004724 HC OB OR TIME LEV III - 1ST 15 MIN: Performed by: OBSTETRICS & GYNECOLOGY

## 2024-03-19 PROCEDURE — 0UN90ZZ RELEASE UTERUS, OPEN APPROACH: ICD-10-PCS | Performed by: OBSTETRICS & GYNECOLOGY

## 2024-03-19 PROCEDURE — 96372 THER/PROPH/DIAG INJ SC/IM: CPT

## 2024-03-19 PROCEDURE — 59510 CESAREAN DELIVERY: CPT | Mod: ,,, | Performed by: OBSTETRICS & GYNECOLOGY

## 2024-03-19 PROCEDURE — 37000008 HC ANESTHESIA 1ST 15 MINUTES: Performed by: OBSTETRICS & GYNECOLOGY

## 2024-03-19 PROCEDURE — 71000033 HC RECOVERY, INTIAL HOUR: Performed by: OBSTETRICS & GYNECOLOGY

## 2024-03-19 PROCEDURE — P9038 RBC IRRADIATED: HCPCS | Performed by: ANESTHESIOLOGY

## 2024-03-19 RX ORDER — MISOPROSTOL 200 UG/1
800 TABLET ORAL ONCE AS NEEDED
Status: DISCONTINUED | OUTPATIENT
Start: 2024-03-19 | End: 2024-03-22 | Stop reason: HOSPADM

## 2024-03-19 RX ORDER — DIPHENHYDRAMINE HCL 25 MG
25 CAPSULE ORAL EVERY 6 HOURS PRN
Status: DISCONTINUED | OUTPATIENT
Start: 2024-03-19 | End: 2024-03-22 | Stop reason: HOSPADM

## 2024-03-19 RX ORDER — IBUPROFEN 400 MG/1
800 TABLET ORAL
Status: DISCONTINUED | OUTPATIENT
Start: 2024-03-20 | End: 2024-03-22 | Stop reason: HOSPADM

## 2024-03-19 RX ORDER — SIMETHICONE 80 MG
1 TABLET,CHEWABLE ORAL EVERY 6 HOURS PRN
Status: DISCONTINUED | OUTPATIENT
Start: 2024-03-19 | End: 2024-03-22 | Stop reason: HOSPADM

## 2024-03-19 RX ORDER — HYDROCORTISONE 25 MG/G
CREAM TOPICAL 3 TIMES DAILY PRN
Status: DISCONTINUED | OUTPATIENT
Start: 2024-03-19 | End: 2024-03-22 | Stop reason: HOSPADM

## 2024-03-19 RX ORDER — ACETAMINOPHEN 500 MG
1000 TABLET ORAL
Status: COMPLETED | OUTPATIENT
Start: 2024-03-19 | End: 2024-03-19

## 2024-03-19 RX ORDER — MORPHINE SULFATE 0.5 MG/ML
INJECTION, SOLUTION EPIDURAL; INTRATHECAL; INTRAVENOUS
Status: DISCONTINUED | OUTPATIENT
Start: 2024-03-19 | End: 2024-03-19

## 2024-03-19 RX ORDER — SODIUM CHLORIDE, SODIUM LACTATE, POTASSIUM CHLORIDE, CALCIUM CHLORIDE 600; 310; 30; 20 MG/100ML; MG/100ML; MG/100ML; MG/100ML
INJECTION, SOLUTION INTRAVENOUS CONTINUOUS
Status: DISCONTINUED | OUTPATIENT
Start: 2024-03-19 | End: 2024-03-20

## 2024-03-19 RX ORDER — OXYCODONE HYDROCHLORIDE 10 MG/1
10 TABLET ORAL EVERY 4 HOURS PRN
Status: DISCONTINUED | OUTPATIENT
Start: 2024-03-19 | End: 2024-03-22 | Stop reason: HOSPADM

## 2024-03-19 RX ORDER — OXYTOCIN/RINGER'S LACTATE 30/500 ML
95 PLASTIC BAG, INJECTION (ML) INTRAVENOUS CONTINUOUS
Status: DISCONTINUED | OUTPATIENT
Start: 2024-03-19 | End: 2024-03-22 | Stop reason: HOSPADM

## 2024-03-19 RX ORDER — ACETAMINOPHEN 500 MG
1000 TABLET ORAL
Status: DISCONTINUED | OUTPATIENT
Start: 2024-03-19 | End: 2024-03-20

## 2024-03-19 RX ORDER — DOCUSATE SODIUM 100 MG/1
200 CAPSULE, LIQUID FILLED ORAL 2 TIMES DAILY
Status: DISCONTINUED | OUTPATIENT
Start: 2024-03-19 | End: 2024-03-22 | Stop reason: HOSPADM

## 2024-03-19 RX ORDER — FAMOTIDINE 10 MG/ML
20 INJECTION INTRAVENOUS
Status: COMPLETED | OUTPATIENT
Start: 2024-03-19 | End: 2024-03-19

## 2024-03-19 RX ORDER — MISOPROSTOL 200 UG/1
TABLET ORAL
Status: COMPLETED
Start: 2024-03-19 | End: 2024-03-19

## 2024-03-19 RX ORDER — HYDROCODONE BITARTRATE AND ACETAMINOPHEN 500; 5 MG/1; MG/1
TABLET ORAL
Status: DISCONTINUED | OUTPATIENT
Start: 2024-03-19 | End: 2024-03-22 | Stop reason: HOSPADM

## 2024-03-19 RX ORDER — TRANEXAMIC ACID 10 MG/ML
1000 INJECTION, SOLUTION INTRAVENOUS EVERY 30 MIN PRN
Status: DISCONTINUED | OUTPATIENT
Start: 2024-03-19 | End: 2024-03-22 | Stop reason: HOSPADM

## 2024-03-19 RX ORDER — PROCHLORPERAZINE EDISYLATE 5 MG/ML
5 INJECTION INTRAMUSCULAR; INTRAVENOUS EVERY 6 HOURS PRN
Status: DISCONTINUED | OUTPATIENT
Start: 2024-03-19 | End: 2024-03-22 | Stop reason: HOSPADM

## 2024-03-19 RX ORDER — ONDANSETRON 8 MG/1
8 TABLET, ORALLY DISINTEGRATING ORAL EVERY 8 HOURS PRN
Status: DISCONTINUED | OUTPATIENT
Start: 2024-03-19 | End: 2024-03-22 | Stop reason: HOSPADM

## 2024-03-19 RX ORDER — ACETAMINOPHEN 325 MG/1
650 TABLET ORAL
Status: DISCONTINUED | OUTPATIENT
Start: 2024-03-19 | End: 2024-03-19

## 2024-03-19 RX ORDER — KETOROLAC TROMETHAMINE 30 MG/ML
30 INJECTION, SOLUTION INTRAMUSCULAR; INTRAVENOUS
Status: DISPENSED | OUTPATIENT
Start: 2024-03-19 | End: 2024-03-20

## 2024-03-19 RX ORDER — OXYTOCIN 10 [USP'U]/ML
INJECTION, SOLUTION INTRAMUSCULAR; INTRAVENOUS
Status: DISCONTINUED | OUTPATIENT
Start: 2024-03-19 | End: 2024-03-19

## 2024-03-19 RX ORDER — ONDANSETRON HYDROCHLORIDE 2 MG/ML
INJECTION, SOLUTION INTRAVENOUS
Status: DISCONTINUED | OUTPATIENT
Start: 2024-03-19 | End: 2024-03-19

## 2024-03-19 RX ORDER — DEXMEDETOMIDINE HYDROCHLORIDE 100 UG/ML
INJECTION, SOLUTION INTRAVENOUS
Status: DISCONTINUED | OUTPATIENT
Start: 2024-03-19 | End: 2024-03-19

## 2024-03-19 RX ORDER — HYDROMORPHONE HYDROCHLORIDE 1 MG/ML
0.5 INJECTION, SOLUTION INTRAMUSCULAR; INTRAVENOUS; SUBCUTANEOUS
Status: DISCONTINUED | OUTPATIENT
Start: 2024-03-19 | End: 2024-03-22 | Stop reason: HOSPADM

## 2024-03-19 RX ORDER — DEXAMETHASONE SODIUM PHOSPHATE 4 MG/ML
INJECTION, SOLUTION INTRA-ARTICULAR; INTRALESIONAL; INTRAMUSCULAR; INTRAVENOUS; SOFT TISSUE
Status: DISCONTINUED | OUTPATIENT
Start: 2024-03-19 | End: 2024-03-19

## 2024-03-19 RX ORDER — SODIUM CITRATE AND CITRIC ACID MONOHYDRATE 334; 500 MG/5ML; MG/5ML
30 SOLUTION ORAL
Status: COMPLETED | OUTPATIENT
Start: 2024-03-19 | End: 2024-03-19

## 2024-03-19 RX ORDER — FENTANYL CITRATE 50 UG/ML
INJECTION, SOLUTION INTRAMUSCULAR; INTRAVENOUS
Status: DISCONTINUED | OUTPATIENT
Start: 2024-03-19 | End: 2024-03-19

## 2024-03-19 RX ORDER — OXYCODONE HYDROCHLORIDE 5 MG/1
5 TABLET ORAL EVERY 4 HOURS PRN
Status: DISCONTINUED | OUTPATIENT
Start: 2024-03-19 | End: 2024-03-22 | Stop reason: HOSPADM

## 2024-03-19 RX ORDER — CEFAZOLIN SODIUM 1 G/3ML
INJECTION, POWDER, FOR SOLUTION INTRAMUSCULAR; INTRAVENOUS
Status: DISCONTINUED | OUTPATIENT
Start: 2024-03-19 | End: 2024-03-19

## 2024-03-19 RX ORDER — BUPIVACAINE HYDROCHLORIDE 7.5 MG/ML
INJECTION, SOLUTION INTRASPINAL
Status: DISCONTINUED | OUTPATIENT
Start: 2024-03-19 | End: 2024-03-19

## 2024-03-19 RX ORDER — PROMETHAZINE HYDROCHLORIDE 12.5 MG/1
12.5 SUPPOSITORY RECTAL ONCE
Status: DISCONTINUED | OUTPATIENT
Start: 2024-03-19 | End: 2024-03-22 | Stop reason: HOSPADM

## 2024-03-19 RX ORDER — AMOXICILLIN 250 MG
1 CAPSULE ORAL NIGHTLY PRN
Status: DISCONTINUED | OUTPATIENT
Start: 2024-03-19 | End: 2024-03-22 | Stop reason: HOSPADM

## 2024-03-19 RX ORDER — METHYLERGONOVINE MALEATE 0.2 MG/ML
200 INJECTION INTRAVENOUS ONCE AS NEEDED
Status: DISCONTINUED | OUTPATIENT
Start: 2024-03-19 | End: 2024-03-22 | Stop reason: HOSPADM

## 2024-03-19 RX ORDER — HYDROCORTISONE 25 MG/G
CREAM TOPICAL 2 TIMES DAILY PRN
Status: DISCONTINUED | OUTPATIENT
Start: 2024-03-19 | End: 2024-03-22 | Stop reason: HOSPADM

## 2024-03-19 RX ORDER — DIPHENHYDRAMINE HYDROCHLORIDE 50 MG/ML
12.5 INJECTION INTRAMUSCULAR; INTRAVENOUS
Status: DISCONTINUED | OUTPATIENT
Start: 2024-03-19 | End: 2024-03-22 | Stop reason: HOSPADM

## 2024-03-19 RX ORDER — MIDAZOLAM HYDROCHLORIDE 5 MG/ML
INJECTION INTRAMUSCULAR; INTRAVENOUS
Status: DISCONTINUED | OUTPATIENT
Start: 2024-03-19 | End: 2024-03-19

## 2024-03-19 RX ORDER — ONDANSETRON HYDROCHLORIDE 2 MG/ML
4 INJECTION, SOLUTION INTRAVENOUS EVERY 6 HOURS PRN
Status: DISCONTINUED | OUTPATIENT
Start: 2024-03-19 | End: 2024-03-22 | Stop reason: HOSPADM

## 2024-03-19 RX ORDER — CARBOPROST TROMETHAMINE 250 UG/ML
250 INJECTION, SOLUTION INTRAMUSCULAR
Status: DISCONTINUED | OUTPATIENT
Start: 2024-03-19 | End: 2024-03-22 | Stop reason: HOSPADM

## 2024-03-19 RX ORDER — TRANEXAMIC ACID 100 MG/ML
INJECTION, SOLUTION INTRAVENOUS
Status: DISCONTINUED | OUTPATIENT
Start: 2024-03-19 | End: 2024-03-19

## 2024-03-19 RX ORDER — PHENYLEPHRINE HYDROCHLORIDE 10 MG/ML
INJECTION INTRAVENOUS
Status: DISCONTINUED | OUTPATIENT
Start: 2024-03-19 | End: 2024-03-19

## 2024-03-19 RX ORDER — NALBUPHINE HYDROCHLORIDE 10 MG/ML
5 INJECTION, SOLUTION INTRAMUSCULAR; INTRAVENOUS; SUBCUTANEOUS ONCE AS NEEDED
Status: DISCONTINUED | OUTPATIENT
Start: 2024-03-19 | End: 2024-03-22 | Stop reason: HOSPADM

## 2024-03-19 RX ORDER — SODIUM CHLORIDE, SODIUM LACTATE, POTASSIUM CHLORIDE, CALCIUM CHLORIDE 600; 310; 30; 20 MG/100ML; MG/100ML; MG/100ML; MG/100ML
INJECTION, SOLUTION INTRAVENOUS CONTINUOUS PRN
Status: DISCONTINUED | OUTPATIENT
Start: 2024-03-19 | End: 2024-03-19

## 2024-03-19 RX ORDER — SODIUM CHLORIDE, SODIUM LACTATE, POTASSIUM CHLORIDE, CALCIUM CHLORIDE 600; 310; 30; 20 MG/100ML; MG/100ML; MG/100ML; MG/100ML
INJECTION, SOLUTION INTRAVENOUS CONTINUOUS
Status: DISCONTINUED | OUTPATIENT
Start: 2024-03-19 | End: 2024-03-22 | Stop reason: HOSPADM

## 2024-03-19 RX ORDER — PRENATAL WITH FERROUS FUM AND FOLIC ACID 3080; 920; 120; 400; 22; 1.84; 3; 20; 10; 1; 12; 200; 27; 25; 2 [IU]/1; [IU]/1; MG/1; [IU]/1; MG/1; MG/1; MG/1; MG/1; MG/1; MG/1; UG/1; MG/1; MG/1; MG/1; MG/1
1 TABLET ORAL DAILY
Status: DISCONTINUED | OUTPATIENT
Start: 2024-03-19 | End: 2024-03-22 | Stop reason: HOSPADM

## 2024-03-19 RX ADMIN — OXYTOCIN 5 UNITS: 10 INJECTION, SOLUTION INTRAMUSCULAR; INTRAVENOUS at 08:03

## 2024-03-19 RX ADMIN — ACETAMINOPHEN 1000 MG: 500 TABLET ORAL at 05:03

## 2024-03-19 RX ADMIN — Medication 95 MILLI-UNITS/MIN: at 09:03

## 2024-03-19 RX ADMIN — OXYTOCIN 3 UNITS: 10 INJECTION, SOLUTION INTRAMUSCULAR; INTRAVENOUS at 08:03

## 2024-03-19 RX ADMIN — OXYTOCIN 5 UNITS: 10 INJECTION, SOLUTION INTRAMUSCULAR; INTRAVENOUS at 07:03

## 2024-03-19 RX ADMIN — MIDAZOLAM HYDROCHLORIDE 2 MG: 5 INJECTION, SOLUTION INTRAMUSCULAR; INTRAVENOUS at 07:03

## 2024-03-19 RX ADMIN — BUPIVACAINE HYDROCHLORIDE IN DEXTROSE 1.6 MG: 7.5 INJECTION, SOLUTION SUBARACHNOID at 06:03

## 2024-03-19 RX ADMIN — ONDANSETRON HYDROCHLORIDE 4 MG: 2 INJECTION INTRAMUSCULAR; INTRAVENOUS at 07:03

## 2024-03-19 RX ADMIN — PHENYLEPHRINE HYDROCHLORIDE 50 MCG: 10 INJECTION INTRAVENOUS at 07:03

## 2024-03-19 RX ADMIN — ACETAMINOPHEN 1000 MG: 500 TABLET ORAL at 09:03

## 2024-03-19 RX ADMIN — Medication 0.1 MG: at 06:03

## 2024-03-19 RX ADMIN — DOCUSATE SODIUM 200 MG: 100 CAPSULE, LIQUID FILLED ORAL at 08:03

## 2024-03-19 RX ADMIN — HYDROMORPHONE HYDROCHLORIDE 0.5 MG: 0.5 INJECTION, SOLUTION INTRAMUSCULAR; INTRAVENOUS; SUBCUTANEOUS at 01:03

## 2024-03-19 RX ADMIN — CEFAZOLIN 2 G: 330 INJECTION, POWDER, FOR SOLUTION INTRAMUSCULAR; INTRAVENOUS at 06:03

## 2024-03-19 RX ADMIN — KETOROLAC TROMETHAMINE 30 MG: 30 INJECTION, SOLUTION INTRAMUSCULAR; INTRAVENOUS at 09:03

## 2024-03-19 RX ADMIN — KETOROLAC TROMETHAMINE 30 MG: 30 INJECTION, SOLUTION INTRAMUSCULAR; INTRAVENOUS at 04:03

## 2024-03-19 RX ADMIN — SODIUM CITRATE AND CITRIC ACID MONOHYDRATE 30 ML: 500; 334 SOLUTION ORAL at 05:03

## 2024-03-19 RX ADMIN — SODIUM CHLORIDE, SODIUM LACTATE, POTASSIUM CHLORIDE, AND CALCIUM CHLORIDE: 600; 310; 30; 20 INJECTION, SOLUTION INTRAVENOUS at 08:03

## 2024-03-19 RX ADMIN — SODIUM CHLORIDE, SODIUM LACTATE, POTASSIUM CHLORIDE, AND CALCIUM CHLORIDE: 600; 310; 30; 20 INJECTION, SOLUTION INTRAVENOUS at 06:03

## 2024-03-19 RX ADMIN — HYDROCORTISONE: 25 CREAM TOPICAL at 09:03

## 2024-03-19 RX ADMIN — SODIUM CHLORIDE, POTASSIUM CHLORIDE, SODIUM LACTATE AND CALCIUM CHLORIDE: 600; 310; 30; 20 INJECTION, SOLUTION INTRAVENOUS at 05:03

## 2024-03-19 RX ADMIN — SODIUM CHLORIDE, SODIUM LACTATE, POTASSIUM CHLORIDE, AND CALCIUM CHLORIDE: 600; 310; 30; 20 INJECTION, SOLUTION INTRAVENOUS at 07:03

## 2024-03-19 RX ADMIN — OXYCODONE HYDROCHLORIDE 10 MG: 10 TABLET ORAL at 10:03

## 2024-03-19 RX ADMIN — PROCHLORPERAZINE EDISYLATE 5 MG: 5 INJECTION INTRAMUSCULAR; INTRAVENOUS at 04:03

## 2024-03-19 RX ADMIN — TRANEXAMIC ACID 1000 MG: 100 INJECTION, SOLUTION INTRAVENOUS at 07:03

## 2024-03-19 RX ADMIN — FAMOTIDINE 20 MG: 10 INJECTION, SOLUTION INTRAVENOUS at 05:03

## 2024-03-19 RX ADMIN — MIDAZOLAM HYDROCHLORIDE 2 MG: 5 INJECTION, SOLUTION INTRAMUSCULAR; INTRAVENOUS at 08:03

## 2024-03-19 RX ADMIN — PHENYLEPHRINE HYDROCHLORIDE 0.5 MCG/KG/MIN: 10 INJECTION INTRAVENOUS at 06:03

## 2024-03-19 RX ADMIN — DEXMEDETOMIDINE HYDROCHLORIDE 8 MCG: 100 INJECTION, SOLUTION INTRAVENOUS at 08:03

## 2024-03-19 RX ADMIN — DEXAMETHASONE SODIUM PHOSPHATE 4 MG: 4 INJECTION, SOLUTION INTRAMUSCULAR; INTRAVENOUS at 07:03

## 2024-03-19 RX ADMIN — ONDANSETRON 4 MG: 2 INJECTION INTRAMUSCULAR; INTRAVENOUS at 01:03

## 2024-03-19 RX ADMIN — FENTANYL CITRATE 10 MCG: 50 INJECTION, SOLUTION INTRAMUSCULAR; INTRAVENOUS at 06:03

## 2024-03-19 NOTE — LACTATION NOTE
Lactation visited pt. Breastfeeding basics reviewed via breastfeeding guide, questions answered. Pt encouraged to feed the baby 8 or more times in 24hrs on cue until content. Baby latched easily with minimal help on positioning. Breast compression used to keep the baby actively feeding.    03/19/24 1710   Maternal Assessment   Breast Shape Bilateral:;round   Breast Density Bilateral:;soft   Areola Bilateral:;elastic   Nipples Bilateral:;everted   Maternal Infant Feeding   Maternal Emotional State assist needed   Infant Positioning clutch/football   Signs of Milk Transfer infant jaw motion present   Pain with Feeding no   Nipple Shape After Feeding, Left round   Latch Assistance yes

## 2024-03-19 NOTE — L&D DELIVERY NOTE
[facility] - Labor & Delivery   Section   Operative Note    SUMMARY     Date of Procedure: 3/19/2024     Procedure: Procedure(s) (LRB):   SECTION (N/A)    Surgeon(s) and Role:     * Vika Brooke, DO - Primary     * Hemal Luis MD - Resident - Assisting    Pre-Operative Diagnosis:   History of  delivery [Z98.891]      Post-Operative Diagnosis: Post-Op Diagnosis Codes:     * History of  delivery [Z98.891]  2. Postpartum hemorrhage due to uterine atony  3. Dense adhesions of rectus muscles to uterine serosa    Anesthesia: Spinal/Epidural    Findings:    1. Single viable  male infant, with APGARS 9/9, weight 3590g.   2. Normal placenta  3. Dense adhesions of the rectus muscle to the uterine serosa requiring > 20 minutes of lysis of adhesion and causing bleeding of the uterine serosa.  4. Uterine atony post delivery of placenta requiring multiple medications, Isidra for resolution  5. Symptomatic blood loss requiring transfusion    Estimated Blood Loss:  2779 mL           Total IV Fluids: see anesthesia note     UOP: see anesthesia note    Procedure Details   The patient was seen in the Holding Room. The risks, benefits, complications, treatment options, and expected outcomes were discussed with the patient.  The patient concurred with the proposed plan, giving informed consent.  The patient was taken to Operating Room, identified as Bety Cristobal and the procedure verified as repeat  Delivery. A Time Out was held and the above information confirmed.    After induction of anesthesia, the patient was prepped and draped in the usual sterile manner while placed in a dorsal supine position with a left lateral tilt.  A sanchez catheter was also placed per nursing. Preoperative antibiotics Ancef 2 g were administered. An allis test was performed to confirm adequate anesthesia.  A Pfannenstiel skin incision was made over former scar and carried down through the  subcutaneous tissue to the fascia. Fascial incision was made and extended transversely. The fascia was grasped with [facility] clamps and  from the underlying rectus tissue superiorly and inferiorly.     Using allis the midline of the rectus muscle was identified. Using a knife, carefully opened at the midline and noted that anterior uterine serosa was adhered to the rectus muscle. Adhered muscle on the right was transected using 2 kellies and Bovie cautery, good hemostasis observed of the muscle.  Serosa noted to be disrupted.    The vesico-uterine peritoneum was identified, and bladder blade was inserted. The vesico-uterine peritoneum was incised transversely and the bladder flap was bluntly freed from the lower uterine segment. The bladder blade was reinserted to keep the bladder out of the operative field. A low transverse uterine incision was made with knife and extended with cephalocaudal traction. The amniotic sac was ruptured upon entry to the hysterotomy and was noted to be clear.  The infant was noted to be in cephalic presentation. The head was brought to the incision and elevated out of the pelvis. The patient delivered a single viable male infant without difficulty.  After the umbilical cord was clamped and cut after delay > 30 seconds.  cord blood was obtained for evaluation. The placenta was removed intact, appeared normal. The uterus was exteriorized and cleared of all clots.  The edges of the hysterotomy were grapsed with ervin clamps.  An area of the serosa perpendicular to the hysterotomy was noted be bleeding and was repaired with a running locked suture.  The low transverse hyterotomy was closed with running locked sutures of 1 chromic. Midline uterine serosa defect from prior muscle adhesion was closed with running lock sutures of 1-0 chromic.  Additional figure of 8 sutures were placed for hemostasis.     Uterine atony was noted while closing hysterotomy. Fundal masage performed,  extra pitocin and methergine given. Atony persisted and large clots were noted under the drape, decision as made to give TXA and cytotec 600 rectally. At this time blood loss was estimated to be > 1500 with persistent atony. A code H was called. A B-colvin suture was placed and then removed once the BECCA was available in the room. Uterus was placed back in abdominal cavity and a BECCA balloon was placed. BECCA was filled with 120 cc and suction at 80 mmHg. Uterine tone improved and bleeding was noted to be stable on the suction tubing. 1 unit of pRBC was started in the OR.     Uterine Incision was reinspected and good hemostasis was noted. The abdominal cavity was swept with moist laps to remove clots. The peritoneum and muscle were reapproximated en bloc with 2-0 vicryl in parallel mattress fashion. The fascia was then reapproximated with running sutures of 1 PDS. The subcutaneous fat was reapproximated with 2-0 vicryl, and skin was reapproximated with 4-0 monocryl in a subcuticular fashion.    Instrument, sponge, and needle counts were correct prior the abdominal closure and at the conclusion of the case.     Patietn was stable and not requiring pressors at the time of transfer to the recovery area.  Burrell catheter remained in place and was noted to be draining clear urine.    VTE Risk Mitigation (From admission, onward)           Ordered     Reason for No Pharmacological VTE Prophylaxis  Once        Question:  Reasons:  Answer:  Neuraxial Anesthesia    24 0918     Place sequential compression device  Until discontinued        Comments: Discontinue when catheter removed    24 0761                    Disposition: PACU - hemodynamically stable.    Attestation: Good         Delivery Information for Esa Cristobal    Birth information:  YOB: 2022   Time of birth: 7:32 AM   Sex: male   Head Delivery Date/Time: 3/19/2024  7:32 AM   Delivery type: , Low Transverse   Gestational Age:  "39w2d        Delivery Providers    Delivering clinician: Vika Brooke, DO   Provider Role    Sierra Padron MD Obstetrician    Hemal Luis MD Resident    Amber Disla, ST Surgical Tech    Tanesha Benítez RN Circulator Hillis, Emily A RN Registered Nurse    Maura Stock RN Registered Nurse              Measurements    Weight: 3590 g  Weight (lbs): 7 lb 14.6 oz  Length: 52.1 cm  Length (in): 20.5"  Head circumference: 36.5 cm  Chest circumference: 34 cm         Apgars    Living status: Living  Apgar Component Scores:  1 min.:  5 min.:  10 min.:  15 min.:  20 min.:    Skin color:  0  1       Heart rate:  2  2       Reflex irritability:  2  2       Muscle tone:  2  2       Respiratory effort:  2  2       Total:  8  9       Apgars assigned by: ALONZO SALTER RN         Operative Delivery    Forceps attempted?: No  Vacuum extractor attempted?: No         Shoulder Dystocia    Shoulder dystocia present?: No           Presentation    Presentation: Vertex           Interventions/Resuscitation    Method: Bulb Suctioning, Tactile Stimulation, Deep Suctioning, CPAP       Cord    Vessels: 3 vessels  Complications: None  Delayed Cord Clamping?: Yes  Cord Clamped Date/Time: 3/19/2024  7:33 AM  Cord Blood Disposition: Sent with Baby       Placenta    Placenta delivery date/time: 3/19/2024 0735  Placenta removal: Manual removal  Placenta appearance: Intact  Placenta disposition: Discarded           Labor Events:       labor: No     Labor Onset Date/Time:         Dilation Complete Date/Time:         Start Pushing Date/Time:         Start Pushing Date/Time:       Rupture Date/Time:            Rupture type:          Fluid Amount:       Fluid Color:                steroids: None     Antibiotics given for GBS: No     Induction: none     Indications for induction:        Augmentation:       Indications for augmentation:       Labor complications: None     Additional complications:        "   Cervical ripening:                     Delivery:      Episiotomy: None     Indication for Episiotomy:       Perineal Lacerations: None Repaired:      Periurethral Laceration:   Repaired:     Labial Laceration:   Repaired:     Sulcus Laceration:   Repaired:     Vaginal Laceration:   Repaired:     Cervical Laceration:   Repaired:     Repair suture: None     Repair # of packets:       Last Value - EBL - Nursing (mL):       Sum - EBL - Nursing (mL): 0     Last Value - EBL - Anesthesia (mL):      Calculated QBL (mL): 1165      Running total QBL (mL): 2779      Vaginal Sweep Performed:       Surgicount Correct:       Vaginal Packing:   Quantity:       Other providers:       Anesthesia    Method: Spinal          Details (if applicable):  Trial of Labor      Categorization: Repeat    Priority: Routine   Indications for : Repeat Section   Incision Type: low transverse     Additional  information:  Forceps:    Vacuum:    Breech:    Observed anomalies    Other (Comments):

## 2024-03-19 NOTE — CARE UPDATE
OB Resident to bedside for evaluation of post-op nausea and vomiting.    Patient reports that she feels episodes of warmth and flushing followed by vomiting. She reports continued nausea despite IV zofran or compazine. Denies sick contacts at home. Reports issues with post-op nausea in the past.    Temp:  [97 °F (36.1 °C)-98.1 °F (36.7 °C)] 97 °F (36.1 °C)  Pulse:  [] 72  Resp:  [15-20] 20  SpO2:  [96 %-100 %] 98 %  BP: ()/(56-76) 107/65    PE:  Gen - resting in bed, no distress  Card - regular rate  Pulm - normal work of breathing  ABD - soft, mildly distended, appropriate post-operative tenderness, no guarding or rebound  Incision - dressing with minimal shadowing  Skin - no rashes noted    Plan:  - vital signs stable and urine output 85 cc/hr over the past two hours  - restart IV fluids at 100 cc/hr  - will order COVID and flu swabs  - phenergan suppository PRN  - will repeat abdominal exam in 1-2 hours and closely monitor overnight    Anthony Gallardo M.D.  OB/GYN PGY- 4

## 2024-03-19 NOTE — PLAN OF CARE
Problem: Skin Injury Risk Increased  Goal: Skin Health and Integrity  Outcome: Ongoing, Progressing     Problem: Infection  Goal: Absence of Infection Signs and Symptoms  Outcome: Ongoing, Progressing     Problem: Postoperative Urinary Retention (Postpartum  Delivery)  Goal: Effective Urinary Elimination  Outcome: Ongoing, Progressing     Problem: Postoperative Nausea and Vomiting (Postpartum  Delivery)  Goal: Nausea and Vomiting Relief  Outcome: Ongoing, Progressing

## 2024-03-19 NOTE — PROGRESS NOTES
"POSTPARTUM PROGRESS NOTE    Subjective:     PPD/POD#: 1   Procedure: Repeat LTCS   EGA: 39w2d   N/V: No   F/C: No   Abd Pain: Mild, well-controlled with oral pain medication   Lochia: Mild   Voiding: Burrell removed with clear urine this AM, awaiting spontaneous void   Ambulating: No   Bowel fnc: Yes   Contraception: Progesterone only pills     Objective:      Temp:  [97 °F (36.1 °C)-97.3 °F (36.3 °C)] 97.3 °F (36.3 °C)  Pulse:  [76-84] 80  Resp:  [16-18] 16  SpO2:  [96 %-100 %] 100 %  BP: ()/(56-76) 121/76    Abdomen: Soft, appropriately tender   Uterus: Firm, no fundal tenderness   Incision: Bandage in place without shadowing     Lab Review    No results for input(s): "NA", "K", "CL", "CO2", "BUN", "CREATININE", "GLU", "PROT", "BILITOT", "ALKPHOS", "ALT", "AST", "MG", "PHOS" in the last 168 hours.    Recent Labs   Lab 03/19/24  0522 03/19/24  0921   WBC 12.37 32.89*   HGB 11.0* 9.5*   HCT 33.7* 28.6*   MCV 90 90    218         I/O    Intake/Output Summary (Last 24 hours) at 3/19/2024 1129  Last data filed at 3/19/2024 0941  Gross per 24 hour   Intake 3200 ml   Output 3079 ml   Net 121 ml        Assessment and Plan:   Postpartum care:  - Patient doing well.  - Continue routine management and advances.  - No nausea and vomiting since yesterday afternoon     PPH  - Due to uterine atony  - S/p BECCA, TXA, methergine, cytotec  - 2 units of pRBC  - Coags stable  - UOP: 1.24 cc/kg/hr    Anemia   - Acute blood loss  - H/H as above  - QBL: 2115 cc  - asymptomatic  - iron/colace    HSV  - No active lesions  - S/p valtrex prophylaxis    AMA  - Lovenox not indicated    Margarette Miller MD PGY-2  Obstetrics and Gynecology    "

## 2024-03-19 NOTE — ED NOTES
High Risk POD#0 s/p rLTCS    H/H Pre: 11/33 > QBL 2115 mL > 1u pRBC > 9/28 > p  Dx: PPH, oral HSV, AMA,  BC: ASK  Meds: ibuprofen, norco, s/p extra pit, TXA, methergine, cytotec 600, BECCA (0845) No PP Lovenox,   Labs: PT/PTT 12/39  INR 1.1 Fibrinogen 225  MALE  [x] circ c/o [ ] done   [ ] keep BECCA on suction until CBC, coags result   [ ] 1300 CBC, CMP, Coags

## 2024-03-19 NOTE — CARE UPDATE
OB Resident to bedside for BECCA removal.    No bleeding noted on dawson's pad while BECCA off of suction. The cervical balloon was deflated and the BECCA device was removed without difficulty. 200 cc of blood in tubing. Fundus noted to be firm and in midline and no vaginal bleeding.  200 cc of clear yellow urine in sanchez catheter bag.  Repeat labs to be drawn at 1300. Discussed possibility of 2nd unit of pRBC depending on results. Patient and her  voiced understanding.    Anthony Gallardo M.D.  OB/GYN PGY- 4

## 2024-03-19 NOTE — ANESTHESIA PREPROCEDURE EVALUATION
[facility]  Anesthesia Pre-Operative Evaluation         Patient Name: Bety Cristobal  YOB: 1985  MRN: [id number]    2024      Bety Cristobal is a 38 y.o. female  @ 39w2d with history of herpes,  who presents for C section after previous section for failed trial of labor. She denies history of HTN, DM, asthma, bleeding diathesis, or complications with anesthesia.       OB History    Para Term  AB Living   2 1 1     1   SAB IAB Ectopic Multiple Live Births           1      # Outcome Date GA Lbr Ed/2nd Weight Sex Delivery Anes PTL Lv   2 Current            1 Term 10/31/20 40w5d  3.771 kg (8 lb 5 oz) M CS-LTranv EPI  GEORGI       Review of patient's allergies indicates:   Allergen Reactions    Doxycycline Anaphylaxis     esopahgeal spasms       Wt Readings from Last 1 Encounters:   24 0500 80 kg (176 lb 5.9 oz)       BP Readings from Last 3 Encounters:   24 120/68   24 104/64   24 115/69       Patient Active Problem List   Diagnosis    Kidney stones- takes citrate, hydrates    H/O herpes labialis- oral outbreaks only    Shoulder pain    Amenorrhea    Pregnancy with one fetus, antepartum    History of  delivery, currently pregnant       Past Surgical History:   Procedure Laterality Date     SECTION         Social History     Socioeconomic History    Marital status:     Number of children: 1   Occupational History    Occupation:      Comment: planner, works at [employer]   Tobacco Use    Smoking status: Never    Smokeless tobacco: Never   Substance and Sexual Activity    Alcohol use: Yes     Alcohol/week: 1.0 standard drink of alcohol     Types: 1 Glasses of wine per week     Comment: socially    Drug use: Never    Sexual activity: Yes     Partners: Male     Birth control/protection: None   Social History Narrative    Elec son 7 months.    [employer].    Dairy allergy.         Chemistry  "       Component Value Date/Time     08/21/2023 0917    K 4.0 08/21/2023 0917     08/21/2023 0917    CO2 24 08/21/2023 0917    BUN 7 08/21/2023 0917    CREATININE 0.8 08/21/2023 0917    GLU 76 08/21/2023 0917        Component Value Date/Time    CALCIUM 10.3 08/21/2023 0917    ALKPHOS 28 (L) 10/06/2022 1019    AST 20 10/06/2022 1019    ALT 16 10/06/2022 1019    BILITOT 0.4 10/06/2022 1019    ESTGFRAFRICA >60.0 06/21/2021 1112    EGFRNONAA >60.0 06/21/2021 1112            Lab Results   Component Value Date    WBC 11.75 02/21/2024    HGB 10.9 (L) 02/21/2024    HCT 32.9 (L) 02/21/2024    MCV 90 02/21/2024     02/21/2024       No results for input(s): "PT", "INR", "PROTIME", "APTT" in the last 72 hours.            Pre-op Assessment    I have reviewed the Patient Summary Reports.       I have reviewed the Medications.     Review of Systems  Anesthesia Hx:             Denies Family Hx of Anesthesia complications.    Denies Personal Hx of Anesthesia complications.                        Physical Exam  General: Well nourished, Cooperative, Alert and Oriented    Airway:  Mallampati: III / II  Mouth Opening: Normal  TM Distance: Normal  Tongue: Normal  Neck ROM: Normal ROM    Dental:  Intact        Anesthesia Plan  Type of Anesthesia, risks & benefits discussed:    Anesthesia Type: Gen ETT, Epidural, Spinal  Intra-op Monitoring Plan: Standard ASA Monitors  Post Op Pain Control Plan: epidural analgesia, intrathecal opioid and multimodal analgesia  Induction:  IV  Airway Plan: Direct and Video, Post-Induction  Informed Consent: Informed consent signed with the Patient and all parties understand the risks and agree with anesthesia plan.  All questions answered.   ASA Score: 2  Day of Surgery Review of History & Physical: H&P Update referred to the surgeon/provider.    Ready For Surgery From Anesthesia Perspective.     .      "

## 2024-03-19 NOTE — H&P
HISTORY AND PHYSICAL                                                OBSTETRICS          Subjective:       Bety Cristobal is a 38 y.o.  female with IUP at 39w2d weeks gestation who presents for scheduled repeat LTCS.    Patient denies contractions, denies vaginal bleeding, denies LOF.   Fetal Movement: normal.    This IUP is complicated by h/o CS x 1, HSV, AMA.    Review of Systems   Constitutional:  Negative for chills, fatigue and fever.   HENT:  Negative for nasal congestion.    Eyes:  Negative for visual disturbance.   Respiratory:  Negative for cough.    Cardiovascular:  Negative for chest pain and palpitations.   Gastrointestinal:  Negative for abdominal pain, bloating, diarrhea and nausea.   Genitourinary:  Negative for dysuria, pelvic pain, vaginal bleeding and vaginal discharge.   Musculoskeletal:  Negative for back pain, joint swelling and leg pain.   Neurological:  Negative for syncope and headaches.   Hematological:  Does not bruise/bleed easily.   Psychiatric/Behavioral:  The patient is not nervous/anxious.        PMHx:   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     Kidney stones        PSHx:   Past Surgical History:   Procedure Laterality Date     SECTION         All:   Review of patient's allergies indicates:   Allergen Reactions    Doxycycline Anaphylaxis     esopahgeal spasms       Meds:   Medications Prior to Admission   Medication Sig Dispense Refill Last Dose    aspirin 81 MG Chew Take 1 tablet (81 mg total) by mouth once daily. 90 tablet 3     benzonatate (TESSALON PERLES) 100 MG capsule Take 1 capsule (100 mg total) by mouth every 6 (six) hours as needed for Cough. (Patient not taking: Reported on 3/11/2024) 30 capsule 1     COVID-19 vac, sb,Pfizer,,PF, (PFIZER COVID-19) 30 mcg/0.3 mL injection        valACYclovir (VALTREX) 500 MG tablet Take 2 pills twice a day if flare (Patient not taking: Reported on  "10/30/2023) 16 tablet 1        SH:   Social History     Socioeconomic History    Marital status:     Number of children: 1   Occupational History    Occupation:      Comment: planner, works at [employer]   Tobacco Use    Smoking status: Never    Smokeless tobacco: Never   Substance and Sexual Activity    Alcohol use: Yes     Alcohol/week: 1.0 standard drink of alcohol     Types: 1 Glasses of wine per week     Comment: socially    Drug use: Never    Sexual activity: Yes     Partners: Male     Birth control/protection: None   Social History Narrative    [employer] son 7 months.    [employer].    Dairy allergy.       FH:   Family History   Problem Relation Age of Onset    Hypertension Mother     Coronary artery disease Mother         stent    Kidney disease Father         kidney stones    No Known Problems Sister     Breast cancer Neg Hx     Ovarian cancer Neg Hx     Colon cancer Neg Hx        OBHx:   OB History    Para Term  AB Living   2 1 1 0 0 1   SAB IAB Ectopic Multiple Live Births   0 0 0 0 1      # Outcome Date GA Lbr Ed/2nd Weight Sex Delivery Anes PTL Lv   2 Current            1 Term 10/31/20 40w5d  3.771 kg (8 lb 5 oz) M CS-LTranv EPI  GEORGI      Name: Sonia Cristobal       Objective:       Ht 5' 6" (1.676 m)   Wt 80 kg (176 lb 5.9 oz)   LMP 2023 (Exact Date)   Breastfeeding No   BMI 28.47 kg/m²     Vitals:    24 0500   Weight: 80 kg (176 lb 5.9 oz)   Height: 5' 6" (1.676 m)       General:   alert, appears stated age and cooperative, no apparent distress   HENT:  normocephalic, atraumatic   Eyes:  extraocular movements and conjunctivae normal   Neck:  supple, range of motion normal, no thyromegaly   Lungs:   no respiratory distress   Heart:   regular rate   Abdomen:  soft, non-tender, non-distended but gravid, no rebound or guarding    Extremities negative edema, negative erythema   FHT: 135, moderate BTBV, +accels, -decels;  Cat 1 (reassuring)                 " TOCO: No ctx   Presentations: cephalic by ultrasound   Cervix:  Deferred                                 Recent Growth Scan: 3018g (49%) at 36w3d    Lab Review  Blood Type O POS  GBBS: negative  Rubella: Immune  RPR: nr  HIV: negative  HepB: negative       Assessment:       39w2d weeks gestation admitted for repeat LTCS    Active Hospital Problems    Diagnosis  POA    History of  delivery, currently pregnant [O34.219]  Yes      Resolved Hospital Problems   No resolved problems to display.          Plan:     rLTCS  - Risks, benefits, alternatives and possible complications have been discussed in detail with the patient.   - h/o LTCS x1 for arrest of descend 3 years ago.   - Consents signed and to chart  - Epidural per Anesthesia  - Draw CBC, T&S  - To OR for rLTCS       Post-Partum Hemorrhage risk - medium    Hemal Bryant MD  Obstetrics and Gynecology- PGY1

## 2024-03-19 NOTE — PLAN OF CARE
24 0908   OB SCREEN   Assessment Type Discharge Planning Brief Assessment   Source of Information health record   Received Prenatal Care Yes   Any indications/suspicions for None   Is this a teen pregnancy No   Is the baby in NICU No   Indication for adoption/Safe Haven No   Indication for DME/post-acute needs No   HIV (+) No   Any congenital  disorders No   Fetal demise/ death No     Patient has been screened for Social Work discharge planning needs. Based on documentation in medical record, no discharge planning needs are anticipated at this time. Should any discharge planning needs arise, please consult .

## 2024-03-19 NOTE — TRANSFER OF CARE
"Anesthesia Transfer of Care Note    Patient: Bety Cristobal    Procedure(s) Performed: Procedure(s) (LRB):   SECTION (N/A)    Patient location: Labor and Delivery    Anesthesia Type: spinal    Transport from OR: Transported from OR on room air with adequate spontaneous ventilation    Post pain: adequate analgesia    Post assessment: no apparent anesthetic complications    Post vital signs: stable    Level of consciousness: awake, alert and oriented    Nausea/Vomiting: no nausea/vomiting    Complications: none    Transfer of care protocol was followed      Last vitals: Visit Vitals  Ht 5' 6" (1.676 m)   Wt 80 kg (176 lb 5.9 oz)   LMP 2023 (Exact Date)   Breastfeeding No   BMI 28.47 kg/m²     "

## 2024-03-19 NOTE — ANESTHESIA PROCEDURE NOTES
Spinal    Diagnosis: IUP  Patient location during procedure: OR  Start time: 3/19/2024 6:38 AM  Timeout: 3/19/2024 6:35 AM  End time: 3/19/2024 6:55 AM    Staffing  Authorizing Provider: Kathy Goodman MD  Performing Provider: Kathy Goodman MD    Staffing  Performed by: Kathy Goodman MD  Authorized by: Kathy Goodman MD    Preanesthetic Checklist  Completed: patient identified, IV checked, site marked, risks and benefits discussed, surgical consent, monitors and equipment checked, pre-op evaluation and timeout performed  Spinal Block  Patient position: sitting  Prep: ChloraPrep  Patient monitoring: continuous pulse ox and frequent blood pressure checks  Approach: midline  Location: L4-5  Injection technique: single shot  CSF Fluid: clear free-flowing CSF  Needle  Needle type: pencil-tip   Needle gauge: 27 G  Needle length: 3.5 in  Additional Documentation: incremental injection  Needle localization: anatomical landmarks  Assessment  Sensory level: T4   Dermatomal levels determined by pinch or prick  Ease of block: easy  Patient's tolerance of the procedure: comfortable throughout block

## 2024-03-20 PROBLEM — N91.2 AMENORRHEA: Status: RESOLVED | Noted: 2023-08-21 | Resolved: 2024-03-20

## 2024-03-20 PROBLEM — O09.523 MULTIGRAVIDA OF ADVANCED MATERNAL AGE IN THIRD TRIMESTER: Status: ACTIVE | Noted: 2024-03-20

## 2024-03-20 PROBLEM — D62 ACUTE BLOOD LOSS ANEMIA: Status: ACTIVE | Noted: 2024-03-20

## 2024-03-20 LAB
ANION GAP SERPL CALC-SCNC: 7 MMOL/L (ref 8–16)
BASOPHILS # BLD AUTO: 0.11 K/UL (ref 0–0.2)
BASOPHILS NFR BLD: 0.5 % (ref 0–1.9)
BUN SERPL-MCNC: 10 MG/DL (ref 6–20)
CALCIUM SERPL-MCNC: 8.2 MG/DL (ref 8.7–10.5)
CHLORIDE SERPL-SCNC: 106 MMOL/L (ref 95–110)
CO2 SERPL-SCNC: 22 MMOL/L (ref 23–29)
CREAT SERPL-MCNC: 0.7 MG/DL (ref 0.5–1.4)
DIFFERENTIAL METHOD BLD: ABNORMAL
EOSINOPHIL # BLD AUTO: 0.1 K/UL (ref 0–0.5)
EOSINOPHIL NFR BLD: 0.3 % (ref 0–8)
ERYTHROCYTE [DISTWIDTH] IN BLOOD BY AUTOMATED COUNT: 14.6 % (ref 11.5–14.5)
EST. GFR  (NO RACE VARIABLE): >60 ML/MIN/1.73 M^2
GLUCOSE SERPL-MCNC: 115 MG/DL (ref 70–110)
HCT VFR BLD AUTO: 24.2 % (ref 37–48.5)
HGB BLD-MCNC: 8.2 G/DL (ref 12–16)
IMM GRANULOCYTES # BLD AUTO: 0.1 K/UL (ref 0–0.04)
IMM GRANULOCYTES NFR BLD AUTO: 0.5 % (ref 0–0.5)
LYMPHOCYTES # BLD AUTO: 1.8 K/UL (ref 1–4.8)
LYMPHOCYTES NFR BLD: 8.2 % (ref 18–48)
MCH RBC QN AUTO: 29.9 PG (ref 27–31)
MCHC RBC AUTO-ENTMCNC: 33.9 G/DL (ref 32–36)
MCV RBC AUTO: 88 FL (ref 82–98)
MONOCYTES # BLD AUTO: 1.6 K/UL (ref 0.3–1)
MONOCYTES NFR BLD: 7.3 % (ref 4–15)
NEUTROPHILS # BLD AUTO: 18.4 K/UL (ref 1.8–7.7)
NEUTROPHILS NFR BLD: 83.2 % (ref 38–73)
NRBC BLD-RTO: 0 /100 WBC
PLATELET # BLD AUTO: 179 K/UL (ref 150–450)
PMV BLD AUTO: 10.3 FL (ref 9.2–12.9)
POTASSIUM SERPL-SCNC: 4.1 MMOL/L (ref 3.5–5.1)
RBC # BLD AUTO: 2.74 M/UL (ref 4–5.4)
SODIUM SERPL-SCNC: 135 MMOL/L (ref 136–145)
WBC # BLD AUTO: 22.15 K/UL (ref 3.9–12.7)

## 2024-03-20 PROCEDURE — 25000003 PHARM REV CODE 250: Performed by: OBSTETRICS & GYNECOLOGY

## 2024-03-20 PROCEDURE — 80048 BASIC METABOLIC PNL TOTAL CA: CPT

## 2024-03-20 PROCEDURE — 63600175 PHARM REV CODE 636 W HCPCS

## 2024-03-20 PROCEDURE — 11000001 HC ACUTE MED/SURG PRIVATE ROOM

## 2024-03-20 PROCEDURE — 25000003 PHARM REV CODE 250

## 2024-03-20 PROCEDURE — 36415 COLL VENOUS BLD VENIPUNCTURE: CPT | Performed by: STUDENT IN AN ORGANIZED HEALTH CARE EDUCATION/TRAINING PROGRAM

## 2024-03-20 PROCEDURE — 85025 COMPLETE CBC W/AUTO DIFF WBC: CPT | Performed by: STUDENT IN AN ORGANIZED HEALTH CARE EDUCATION/TRAINING PROGRAM

## 2024-03-20 PROCEDURE — 36415 COLL VENOUS BLD VENIPUNCTURE: CPT | Mod: XB

## 2024-03-20 RX ORDER — LANOLIN ALCOHOL/MO/W.PET/CERES
1 CREAM (GRAM) TOPICAL DAILY
Status: DISCONTINUED | OUTPATIENT
Start: 2024-03-20 | End: 2024-03-22 | Stop reason: HOSPADM

## 2024-03-20 RX ORDER — ACETAMINOPHEN 325 MG/1
650 TABLET ORAL
Status: DISCONTINUED | OUTPATIENT
Start: 2024-03-20 | End: 2024-03-22 | Stop reason: HOSPADM

## 2024-03-20 RX ADMIN — OXYCODONE HYDROCHLORIDE 5 MG: 5 TABLET ORAL at 11:03

## 2024-03-20 RX ADMIN — OXYCODONE HYDROCHLORIDE 5 MG: 5 TABLET ORAL at 09:03

## 2024-03-20 RX ADMIN — ACETAMINOPHEN 650 MG: 325 TABLET, FILM COATED ORAL at 09:03

## 2024-03-20 RX ADMIN — SODIUM CHLORIDE, POTASSIUM CHLORIDE, SODIUM LACTATE AND CALCIUM CHLORIDE 500 ML: 600; 310; 30; 20 INJECTION, SOLUTION INTRAVENOUS at 02:03

## 2024-03-20 RX ADMIN — ACETAMINOPHEN 650 MG: 325 TABLET, FILM COATED ORAL at 04:03

## 2024-03-20 RX ADMIN — DOCUSATE SODIUM 200 MG: 100 CAPSULE, LIQUID FILLED ORAL at 08:03

## 2024-03-20 RX ADMIN — ACETAMINOPHEN 650 MG: 325 TABLET, FILM COATED ORAL at 11:03

## 2024-03-20 RX ADMIN — ACETAMINOPHEN 1000 MG: 500 TABLET ORAL at 03:03

## 2024-03-20 RX ADMIN — PRENATAL VIT W/ FE FUMARATE-FA TAB 27-0.8 MG 1 TABLET: 27-0.8 TAB at 08:03

## 2024-03-20 RX ADMIN — OXYCODONE HYDROCHLORIDE 5 MG: 5 TABLET ORAL at 07:03

## 2024-03-20 RX ADMIN — FERROUS SULFATE TAB 325 MG (65 MG ELEMENTAL FE) 1 EACH: 325 (65 FE) TAB at 08:03

## 2024-03-20 RX ADMIN — IBUPROFEN 800 MG: 400 TABLET ORAL at 09:03

## 2024-03-20 RX ADMIN — KETOROLAC TROMETHAMINE 30 MG: 30 INJECTION, SOLUTION INTRAMUSCULAR; INTRAVENOUS at 03:03

## 2024-03-20 RX ADMIN — OXYCODONE HYDROCHLORIDE 5 MG: 5 TABLET ORAL at 02:03

## 2024-03-20 RX ADMIN — IBUPROFEN 800 MG: 400 TABLET ORAL at 11:03

## 2024-03-20 RX ADMIN — IBUPROFEN 800 MG: 400 TABLET ORAL at 04:03

## 2024-03-20 NOTE — PLAN OF CARE
VSS. Pain controlled with oral pain medication. Fundus firm and midline with light lochia rubra. LTV dressing in place, dry/intact.  Adequate output. No concerns at this time.

## 2024-03-20 NOTE — LACTATION NOTE
03/20/24 1208   Maternal Assessment   Breast Shape Bilateral:;round   Breast Density Bilateral:;soft   Areola Bilateral:;elastic   Nipples Bilateral:;everted   Maternal Infant Feeding   Maternal Preparation breast care;hand hygiene   Maternal Emotional State assist needed;relaxed   Infant Positioning clutch/football   Signs of Milk Transfer audible swallow;infant jaw motion present   Pain with Feeding no   Comfort Measures Before/During Feeding infant position adjusted;maternal position adjusted   Nipple Shape After Feeding, Right round   Latch Assistance yes   Community Referrals   Community Referrals outpatient lactation program     LC to room: introduced self, client attempting to latch infant, states baby is very sleepy. LC offered assistance with waking and latching infant, client accepted. LC assisted with placing infant s2s in R FB position. Infant more alert and able to latch deeply after a few drops of colostrum hand expressed onto his upper lip. Feeding and satiation cues reviewed, cluster feeding reviewed, expected diaper counts on day of life and next day reviewed. All questions answered, parents v/u, extension on whiteboard. MBU RN updated.   74

## 2024-03-20 NOTE — ANESTHESIA POSTPROCEDURE EVALUATION
Anesthesia Post Evaluation    Patient: Bety Cristobal    Procedure(s) Performed: Procedure(s) (LRB):   SECTION (N/A)    Final Anesthesia Type: spinal      Patient location during evaluation: med/surg floor  Patient participation: Yes- Able to Participate  Level of consciousness: awake and alert  Post-procedure vital signs: reviewed and stable  Pain management: adequate  Airway patency: patent  ABNER mitigation strategies: Multimodal analgesia and Use of major conduction anesthesia (spinal/epidural) or peripheral nerve block  PONV status at discharge: No PONV  Anesthetic complications: no      Cardiovascular status: blood pressure returned to baseline and hemodynamically stable  Respiratory status: unassisted, spontaneous ventilation and room air  Hydration status: euvolemic  Follow-up not needed.              Vitals Value Taken Time   /60 24 1234   Temp 36.8 °C (98.3 °F) 24 1234   Pulse 82 24 1234   Resp 18 24 1234   SpO2 100 % 24 1234         Event Time   Out of Recovery 11:10:00         Pain/Yeimi Score: Pain Rating Prior to Med Admin: 6 (3/20/2024  9:43 AM)

## 2024-03-21 PROCEDURE — 25000003 PHARM REV CODE 250: Performed by: OBSTETRICS & GYNECOLOGY

## 2024-03-21 PROCEDURE — 25000003 PHARM REV CODE 250

## 2024-03-21 PROCEDURE — 11000001 HC ACUTE MED/SURG PRIVATE ROOM

## 2024-03-21 RX ADMIN — IBUPROFEN 800 MG: 400 TABLET ORAL at 11:03

## 2024-03-21 RX ADMIN — PRENATAL VIT W/ FE FUMARATE-FA TAB 27-0.8 MG 1 TABLET: 27-0.8 TAB at 08:03

## 2024-03-21 RX ADMIN — SIMETHICONE 80 MG: 80 TABLET, CHEWABLE ORAL at 04:03

## 2024-03-21 RX ADMIN — FERROUS SULFATE TAB 325 MG (65 MG ELEMENTAL FE) 1 EACH: 325 (65 FE) TAB at 08:03

## 2024-03-21 RX ADMIN — OXYCODONE HYDROCHLORIDE 10 MG: 10 TABLET ORAL at 01:03

## 2024-03-21 RX ADMIN — ACETAMINOPHEN 650 MG: 325 TABLET, FILM COATED ORAL at 01:03

## 2024-03-21 RX ADMIN — OXYCODONE HYDROCHLORIDE 10 MG: 10 TABLET ORAL at 03:03

## 2024-03-21 RX ADMIN — IBUPROFEN 800 MG: 400 TABLET ORAL at 04:03

## 2024-03-21 RX ADMIN — OXYCODONE HYDROCHLORIDE 10 MG: 10 TABLET ORAL at 08:03

## 2024-03-21 RX ADMIN — ACETAMINOPHEN 650 MG: 325 TABLET, FILM COATED ORAL at 05:03

## 2024-03-21 RX ADMIN — IBUPROFEN 800 MG: 400 TABLET ORAL at 08:03

## 2024-03-21 RX ADMIN — OXYCODONE HYDROCHLORIDE 10 MG: 10 TABLET ORAL at 04:03

## 2024-03-21 RX ADMIN — OXYCODONE HYDROCHLORIDE 10 MG: 10 TABLET ORAL at 09:03

## 2024-03-21 RX ADMIN — SIMETHICONE 80 MG: 80 TABLET, CHEWABLE ORAL at 10:03

## 2024-03-21 RX ADMIN — ACETAMINOPHEN 650 MG: 325 TABLET, FILM COATED ORAL at 08:03

## 2024-03-21 NOTE — PHYSICIAN QUERY
"  PT Name: Bety Cristobal  MR #: [id number]     DOCUMENTATION CLARIFICATION     CDS/: RUBEN Locke,RNC-MNN         Contact information:[email or web address]  This form is a permanent document in the medical record.    Query Date: March 21, 2024  By submitting this query, we are merely seeking further clarification of documentation. Please utilize your independent clinical judgment when addressing the question(s) below.      Indicators Supporting Clinical Findings Location in Medical Record   X Documentation of "HSV" HSV  - No active lesions    H/O herpes labialis- oral outbreaks only     COMPREHENSIVE GYN HISTORY:  PAP History: Denies abnormal Paps.  Infection History: hx hsv  Current encounter OB Progress note 3/21@1000am    Current encounter Anesthesia note 3/19    Previous encounter progress note 8/21/23    Genitourinary Exam/Sterile Speculum Exam     X Delivery Type Repeat LTCS  Current encounter OB Progress note 3/21@1000am   X Medications  Treatment S/p valtrex prophylaxis  Current encounter OB Progress note 3/21@1000am    Other       Provider, please clarify historical site/s of herpes simplex virus (HSV) associated with the above clinical findings:  [ x  ] Genital, unspecified   [   ] Oral   [   ] Vulva   [   ] Vagina   [   ] Labia   [   ] Cervix   [   ] Anus (perianal skin) and/or Rectum   [   ] Other (please specify): ______________   [  ] Clinically undetermined           Please document in your progress notes daily for the duration of treatment, until resolved, and include in your discharge summary.  Form 25649        "

## 2024-03-21 NOTE — LACTATION NOTE
03/21/24 1325   Maternal Assessment   Breast Shape Bilateral:;round   Breast Density Bilateral:;soft   Areola Bilateral:;elastic   Nipples Bilateral:;everted   Maternal Infant Feeding   Maternal Preparation breast care;hand hygiene   Maternal Emotional State relaxed;independent   Pain with Feeding no   Latch Assistance no   Community Referrals   Community Referrals outpatient lactation program     LC to room: follow-up visit. Client reported that feedings were improving, infant cluster feeding, milk is transitioning and reported feeling more full. Education reviewed with NB/PP booklet on engorgement s&s, care, s&s mastitis. Client v/u how to manage engorgement, v/u feeding and satiation cues, feeding on cue 8 or more times in 24 hours. All questions answered, parents v/u, extension on whiteboard to call PRN for assist/questions. MBU RN updated.

## 2024-03-21 NOTE — PROGRESS NOTES
POSTPARTUM PROGRESS NOTE    Subjective:     PPD/POD#: 2   Procedure: Repeat LTCS   EGA: 39w2d   N/V: No   F/C: No   Abd Pain: Mild, well-controlled with oral pain medication   Lochia: Mild   Voiding: Voiding spontaneously   Ambulating: No   Bowel fnc: Yes   Contraception: Progesterone only pills     Patient had lose stools over night, she discontinued the stool softeners, with improvement.     Objective:      Temp:  [98 °F (36.7 °C)-98.5 °F (36.9 °C)] 98 °F (36.7 °C)  Pulse:  [82-98] 83  Resp:  [15-18] 15  SpO2:  [98 %-100 %] 98 %  BP: ()/(57-72) 99/57    Abdomen: Soft, appropriately tender   Uterus: Firm, no fundal tenderness   Incision: Bandage in place without shadowing     Lab Review    Recent Labs   Lab 03/19/24  1322 03/20/24  0610   * 135*   K 4.0 4.1    106   CO2 21* 22*   BUN 10 10   CREATININE 0.7 0.7    115*   PROT 5.1*  --    BILITOT 1.0  --    ALKPHOS 91  --    ALT 13  --    AST 27  --        Recent Labs   Lab 03/19/24  0921 03/19/24  1322 03/20/24  0513   WBC 32.89* 24.34* 22.15*   HGB 9.5* 8.6* 8.2*   HCT 28.6* 25.5* 24.2*   MCV 90 88 88    219 179         I/O    Intake/Output Summary (Last 24 hours) at 3/21/2024 0710  Last data filed at 3/20/2024 1130  Gross per 24 hour   Intake --   Output 800 ml   Net -800 ml        Assessment and Plan:   Postpartum care:  - Patient doing well.  - Continue routine management and advances.    PPH  - Due to uterine atony  - S/p BECCA, TXA, methergine, cytotec  - 2 units of pRBC  - Coags stable  - UOP: 1.24 cc/kg/hr    Anemia   - Acute blood loss  - H/H as above  - QBL: 2115 cc  - asymptomatic  - iron/colace    HSV  - No active lesions  - S/p valtrex prophylaxis    AMA  - Lovenox not indicated    Hemal Bryant MD  Obstetrics and Gynecology- PGY1

## 2024-03-22 VITALS
HEIGHT: 66 IN | TEMPERATURE: 99 F | BODY MASS INDEX: 28.34 KG/M2 | HEART RATE: 97 BPM | RESPIRATION RATE: 16 BRPM | OXYGEN SATURATION: 98 % | WEIGHT: 176.38 LBS | DIASTOLIC BLOOD PRESSURE: 78 MMHG | SYSTOLIC BLOOD PRESSURE: 124 MMHG

## 2024-03-22 PROBLEM — Z98.891 S/P CESAREAN SECTION: Status: ACTIVE | Noted: 2024-03-22

## 2024-03-22 LAB
BLD PROD TYP BPU: NORMAL
BLOOD UNIT EXPIRATION DATE: NORMAL
BLOOD UNIT TYPE CODE: 5100
BLOOD UNIT TYPE: NORMAL
CODING SYSTEM: NORMAL
CROSSMATCH INTERPRETATION: NORMAL
DISPENSE STATUS: NORMAL
NUM UNITS TRANS PACKED RBC: NORMAL

## 2024-03-22 PROCEDURE — 25000003 PHARM REV CODE 250

## 2024-03-22 PROCEDURE — 25000003 PHARM REV CODE 250: Performed by: OBSTETRICS & GYNECOLOGY

## 2024-03-22 RX ORDER — DOCUSATE SODIUM 100 MG/1
200 CAPSULE, LIQUID FILLED ORAL 2 TIMES DAILY
Qty: 30 CAPSULE | Refills: 0 | Status: SHIPPED | OUTPATIENT
Start: 2024-03-22

## 2024-03-22 RX ORDER — OXYCODONE HYDROCHLORIDE 5 MG/1
5 TABLET ORAL EVERY 4 HOURS PRN
Qty: 15 TABLET | Refills: 0 | Status: SHIPPED | OUTPATIENT
Start: 2024-03-22 | End: 2024-03-22

## 2024-03-22 RX ORDER — ACETAMINOPHEN 325 MG/1
650 TABLET ORAL EVERY 6 HOURS
Qty: 30 TABLET | Refills: 0 | Status: SHIPPED | OUTPATIENT
Start: 2024-03-22 | End: 2024-04-30

## 2024-03-22 RX ORDER — FERROUS SULFATE 325(65) MG
325 TABLET, DELAYED RELEASE (ENTERIC COATED) ORAL DAILY
Qty: 30 TABLET | Refills: 0 | Status: SHIPPED | OUTPATIENT
Start: 2024-03-22

## 2024-03-22 RX ORDER — ACETAMINOPHEN 325 MG/1
650 TABLET ORAL EVERY 6 HOURS
Qty: 30 TABLET | Refills: 0 | Status: SHIPPED | OUTPATIENT
Start: 2024-03-22 | End: 2024-03-22

## 2024-03-22 RX ORDER — FERROUS SULFATE 325(65) MG
325 TABLET, DELAYED RELEASE (ENTERIC COATED) ORAL DAILY
Qty: 30 TABLET | Refills: 0 | Status: SHIPPED | OUTPATIENT
Start: 2024-03-22 | End: 2024-03-22

## 2024-03-22 RX ORDER — DOCUSATE SODIUM 100 MG/1
200 CAPSULE, LIQUID FILLED ORAL 2 TIMES DAILY
Qty: 30 CAPSULE | Refills: 0 | Status: SHIPPED | OUTPATIENT
Start: 2024-03-22 | End: 2024-03-22

## 2024-03-22 RX ORDER — IBUPROFEN 600 MG/1
600 TABLET ORAL 3 TIMES DAILY
Qty: 30 TABLET | Refills: 0 | Status: SHIPPED | OUTPATIENT
Start: 2024-03-22 | End: 2024-03-22

## 2024-03-22 RX ORDER — OXYCODONE HYDROCHLORIDE 5 MG/1
5 TABLET ORAL EVERY 4 HOURS PRN
Qty: 15 TABLET | Refills: 0 | Status: SHIPPED | OUTPATIENT
Start: 2024-03-22 | End: 2024-03-25 | Stop reason: SDUPTHER

## 2024-03-22 RX ORDER — IBUPROFEN 600 MG/1
600 TABLET ORAL 3 TIMES DAILY
Qty: 30 TABLET | Refills: 0 | Status: SHIPPED | OUTPATIENT
Start: 2024-03-22 | End: 2024-04-30

## 2024-03-22 RX ADMIN — FERROUS SULFATE TAB 325 MG (65 MG ELEMENTAL FE) 1 EACH: 325 (65 FE) TAB at 08:03

## 2024-03-22 RX ADMIN — ACETAMINOPHEN 650 MG: 325 TABLET, FILM COATED ORAL at 01:03

## 2024-03-22 RX ADMIN — OXYCODONE HYDROCHLORIDE 10 MG: 10 TABLET ORAL at 10:03

## 2024-03-22 RX ADMIN — IBUPROFEN 800 MG: 400 TABLET ORAL at 01:03

## 2024-03-22 RX ADMIN — OXYCODONE HYDROCHLORIDE 10 MG: 10 TABLET ORAL at 06:03

## 2024-03-22 RX ADMIN — ACETAMINOPHEN 650 MG: 325 TABLET, FILM COATED ORAL at 08:03

## 2024-03-22 RX ADMIN — OXYCODONE HYDROCHLORIDE 10 MG: 10 TABLET ORAL at 01:03

## 2024-03-22 RX ADMIN — IBUPROFEN 800 MG: 400 TABLET ORAL at 06:03

## 2024-03-22 RX ADMIN — PRENATAL VIT W/ FE FUMARATE-FA TAB 27-0.8 MG 1 TABLET: 27-0.8 TAB at 08:03

## 2024-03-22 NOTE — PLAN OF CARE
03/22/24 1231   Final Note   Assessment Type Final Discharge Note   Anticipated Discharge Disposition Home   Post-Acute Status   Discharge Delays None known at this time

## 2024-03-22 NOTE — DISCHARGE SUMMARY
Delivery Discharge Summary  Obstetrics      Primary OB Clinician: Vika Brooke DO      Admission date: 3/19/2024  Discharge date: 2024    Disposition: To home, self care    Discharge Diagnosis List:      Patient Active Problem List   Diagnosis    Kidney stones- takes citrate, hydrates    H/O herpes labialis- oral outbreaks only    Shoulder pain    Pregnancy with one fetus, antepartum    History of  delivery, currently pregnant    Uterine atony    Third-stage postpartum hemorrhage    Acute blood loss anemia    Multigravida of advanced maternal age in third trimester    S/P  section       Procedure: , due to repeat    Hospital Course:  Bety Cristobal is a 38 y.o. now , POD #3 who was admitted on 3/19/2024 repeat LTCS. Patient was subsequently admitted to labor and delivery unit with signed consents.   Surgery was complicated by PPH, and patient received extra pit, TXA, methergine, cytotec 600 cc, BECCA was placed and later removed without difficulties. Patient received 2 units of pRBC. The remaining  of the hospital stay was uncomplicated. On discharge day, patient's pain is controlled with oral pain medications. Pt is tolerating ambulation without SOB or CP, and regular diet without N/V. Reports lochia is mild. Denies any HA, vision changes, F/C, LE swelling. Denies any breast pain/soreness.    Pt in stable condition and ready for discharge. She has been instructed to start and/or continue medications and follow up with her obstetrics provider as listed below.    Pertinent studies:  CBC  Recent Labs   Lab 24  0921 24  1322 24  0513   WBC 32.89* 24.34* 22.15*   HGB 9.5* 8.6* 8.2*   HCT 28.6* 25.5* 24.2*   MCV 90 88 88    219 179        Immunization History   Administered Date(s) Administered    COVID-19, MRNA, LN-S, PF (Pfizer) (Purple Cap) 2021    COVID-19, mRNA, LNP-S, PF, sb-sucrose, 30 mcg/0.3 mL (Pfizer  Ages 12+) 2023  "   PPD Test 2016    Tdap 2024        Delivery:    Episiotomy: None   Lacerations: None   Repair suture: None   Repair # of packets:     Blood loss (ml):       Birth information:  YOB: 2022   Time of birth: 7:32 AM   Sex: male   Delivery type: , Low Transverse   Gestational Age: 39w2d     Measurements    Weight: 3590 g  Weight (lbs): 7 lb 14.6 oz  Length: 52.1 cm  Length (in): 20.5"  Head circumference: 36.5 cm  Chest circumference: 34 cm         Delivery Clinician: Delivery Providers    Delivering clinician: Vika Brooke, DO   Provider Role    Sierra Padron MD Obstetrician    Hemal Luis MD Resident    Amber Disla, ST Surgical Tech    Tanesha Benítez RN Circulator Hillis, Emily A, RN Registered Nurse    Maura Stock RN Registered Nurse             Additional  information:  Forceps:    Vacuum:    Breech:    Observed anomalies      Living?:     Apgars    Living status: Living  Apgar Component Scores:  1 min.:  5 min.:  10 min.:  15 min.:  20 min.:    Skin color:  0  1       Heart rate:  2  2       Reflex irritability:  2  2       Muscle tone:  2  2       Respiratory effort:  2  2       Total:  8  9       Apgars assigned by: ALONZO SALTER RN         Placenta: Delivered:       appearance    Patient Instructions:   Current Discharge Medication List        START taking these medications    Details   acetaminophen (TYLENOL) 325 MG tablet Take 2 tablets (650 mg total) by mouth every 6 (six) hours.  Qty: 30 tablet, Refills: 0      docusate sodium (COLACE) 100 MG capsule Take 2 capsules (200 mg total) by mouth 2 (two) times daily.  Qty: 30 capsule, Refills: 0      ferrous sulfate 325 (65 FE) MG EC tablet Take 1 tablet (325 mg total) by mouth once daily.  Qty: 30 tablet, Refills: 0      ibuprofen (ADVIL,MOTRIN) 600 MG tablet Take 1 tablet (600 mg total) by mouth 3 (three) times daily.  Qty: 30 tablet, Refills: 0      oxyCODONE (ROXICODONE) 5 MG immediate " release tablet Take 1 tablet (5 mg total) by mouth every 4 (four) hours as needed for Pain.  Qty: 15 tablet, Refills: 0    Comments: Quantity prescribed more than 7 day supply? No           STOP taking these medications       aspirin 81 MG Chew Comments:   Reason for Stopping:         benzonatate (TESSALON PERLES) 100 MG capsule Comments:   Reason for Stopping:         COVID-19 vac, sb,Pfizer,,PF, (PFIZER COVID-19) 30 mcg/0.3 mL injection Comments:   Reason for Stopping:         valACYclovir (VALTREX) 500 MG tablet Comments:   Reason for Stopping:               Discharge Procedure Orders   BREAST PUMP FOR HOME USE     Order Specific Question Answer Comments   Type of pump: Electric    Weight: 80 kg (176 lb 5.9 oz)    Length of need (1-99 months): 99      Diet Adult Regular     No driving until:   Order Comments: No driving until not taking narcotic pain medication.     Pelvic Rest   Order Comments: Pelvic rest until 6 weeks after discharge. Nothing in vagina -no sex, tampons, douching, etc.     Notify your health care provider if you experience any of the following:  temperature >100.4     Notify your health care provider if you experience any of the following:  persistent nausea and vomiting or diarrhea     Notify your health care provider if you experience any of the following:  severe uncontrolled pain     Notify your health care provider if you experience any of the following:  redness, tenderness, or signs of infection (pain, swelling, redness, odor or green/yellow discharge around incision site)     Notify your health care provider if you experience any of the following:  difficulty breathing or increased cough     Notify your health care provider if you experience any of the following:  severe persistent headache     Notify your health care provider if you experience any of the following:  worsening rash     Notify your health care provider if you experience any of the following:  persistent dizziness,  light-headedness, or visual disturbances     Notify your health care provider if you experience any of the following:  increased confusion or weakness     Notify your health care provider if you experience any of the following:   Order Comments: Heavy vaginal bleeding saturating more than 1 pad per hr for at least consecutive 2 hrs.     Activity as tolerated        Follow-up Information       Vika Brooke, DO Follow up in 6 week(s).    Specialty: Obstetrics and Gynecology  Why: post-op follow up  Contact information:  [address] [id number] [phone]

## 2024-03-22 NOTE — PROGRESS NOTES
POSTPARTUM PROGRESS NOTE    Subjective:     PPD/POD#: 3   Procedure: Repeat LTCS   EGA: 39w2d   N/V: No   F/C: No   Abd Pain: Mild, well-controlled with oral pain medication   Lochia: Mild   Voiding: Voiding spontaneously   Ambulating: No   Bowel fnc: Yes   Contraception: Progesterone only pills       Objective:      Temp:  [98.1 °F (36.7 °C)-98.3 °F (36.8 °C)] 98.1 °F (36.7 °C)  Pulse:  [89-98] 91  Resp:  [16-18] 16  SpO2:  [99 %-100 %] 99 %  BP: (105-118)/(58-69) 118/69    Abdomen: Soft, appropriately tender   Uterus: Firm, no fundal tenderness   Incision: Bandage in place without shadowing     Lab Review    Recent Labs   Lab 03/19/24  1322 03/20/24  0610   * 135*   K 4.0 4.1    106   CO2 21* 22*   BUN 10 10   CREATININE 0.7 0.7    115*   PROT 5.1*  --    BILITOT 1.0  --    ALKPHOS 91  --    ALT 13  --    AST 27  --        Recent Labs   Lab 03/19/24  0921 03/19/24  1322 03/20/24  0513   WBC 32.89* 24.34* 22.15*   HGB 9.5* 8.6* 8.2*   HCT 28.6* 25.5* 24.2*   MCV 90 88 88    219 179         I/O  No intake or output data in the 24 hours ending 03/22/24 0943       Assessment and Plan:   Postpartum care:  - Patient doing well.  - Continue routine management and advances.  - patient desires to go home today.     PPH  - Due to uterine atony  - S/p BECCA, TXA, methergine, cytotec  - 2 units of pRBC  - Coags stable  - UOP: 1.24 cc/kg/hr    Anemia   - Acute blood loss  - H/H as above stable  - QBL: 2115 cc  - asymptomatic  - iron/colace    HSV  - No active lesions  - S/p valtrex prophylaxis    AMA  - Lovenox not indicated

## 2024-03-22 NOTE — PLAN OF CARE
Lactation note:  The mother is expected to be discharged home today. Using the breastfeeding guide, breastfeeding information for discharge was reviewed, including sore nipples and breast engorgement. Mom states infant nursing well. Has no complaints at this time. Offered to assist with breastfeeding at next feeding. The feeding plan for baby at home was reviewed. The mother will continue to feed her baby with cues and at least 8 times in 24 hours.  The patient was taught how to perform hand expression and she has a breast pump and has another one from insurance. The lactation phone number is on the board to call for further needs.  Additional resources were placed on her AVS to be given at discharge.

## 2024-03-25 ENCOUNTER — PATIENT MESSAGE (OUTPATIENT)
Dept: OBSTETRICS AND GYNECOLOGY | Facility: CLINIC | Age: 39
End: 2024-03-25
Payer: OTHER GOVERNMENT

## 2024-03-25 ENCOUNTER — PATIENT MESSAGE (OUTPATIENT)
Dept: OBSTETRICS AND GYNECOLOGY | Facility: OTHER | Age: 39
End: 2024-03-25
Payer: OTHER GOVERNMENT

## 2024-03-25 RX ORDER — OXYCODONE HYDROCHLORIDE 5 MG/1
5 TABLET ORAL EVERY 4 HOURS PRN
Qty: 20 TABLET | Refills: 0 | Status: SHIPPED | OUTPATIENT
Start: 2024-03-25 | End: 2024-04-30

## 2024-03-25 RX ORDER — IBUPROFEN 600 MG/1
600 TABLET ORAL 3 TIMES DAILY
Qty: 30 TABLET | Refills: 0 | Status: CANCELLED | OUTPATIENT
Start: 2024-03-25

## 2024-04-04 ENCOUNTER — PATIENT MESSAGE (OUTPATIENT)
Dept: OBSTETRICS AND GYNECOLOGY | Facility: CLINIC | Age: 39
End: 2024-04-04
Payer: OTHER GOVERNMENT

## 2024-04-04 DIAGNOSIS — Z30.011 ENCOUNTER FOR INITIAL PRESCRIPTION OF CONTRACEPTIVE PILLS: Primary | ICD-10-CM

## 2024-04-05 RX ORDER — NORGESTIMATE AND ETHINYL ESTRADIOL 7DAYSX3 LO
1 KIT ORAL DAILY
Qty: 84 TABLET | Refills: 3 | Status: SHIPPED | OUTPATIENT
Start: 2024-04-05 | End: 2025-04-05

## 2024-04-30 ENCOUNTER — LAB VISIT (OUTPATIENT)
Dept: LAB | Facility: OTHER | Age: 39
End: 2024-04-30
Payer: OTHER GOVERNMENT

## 2024-04-30 ENCOUNTER — POSTPARTUM VISIT (OUTPATIENT)
Dept: OBSTETRICS AND GYNECOLOGY | Facility: CLINIC | Age: 39
End: 2024-04-30
Payer: OTHER GOVERNMENT

## 2024-04-30 VITALS
BODY MASS INDEX: 25.3 KG/M2 | HEIGHT: 66 IN | DIASTOLIC BLOOD PRESSURE: 78 MMHG | WEIGHT: 157.44 LBS | SYSTOLIC BLOOD PRESSURE: 110 MMHG

## 2024-04-30 DIAGNOSIS — D64.9 ANEMIA, UNSPECIFIED TYPE: ICD-10-CM

## 2024-04-30 DIAGNOSIS — Z98.891 S/P C-SECTION: Primary | ICD-10-CM

## 2024-04-30 LAB
BASOPHILS # BLD AUTO: 0.04 K/UL (ref 0–0.2)
BASOPHILS NFR BLD: 0.4 % (ref 0–1.9)
DIFFERENTIAL METHOD BLD: ABNORMAL
EOSINOPHIL # BLD AUTO: 0.1 K/UL (ref 0–0.5)
EOSINOPHIL NFR BLD: 0.9 % (ref 0–8)
ERYTHROCYTE [DISTWIDTH] IN BLOOD BY AUTOMATED COUNT: 15.3 % (ref 11.5–14.5)
FERRITIN SERPL-MCNC: 37 NG/ML (ref 20–300)
HCT VFR BLD AUTO: 38.5 % (ref 37–48.5)
HGB BLD-MCNC: 12.1 G/DL (ref 12–16)
IMM GRANULOCYTES # BLD AUTO: 0.02 K/UL (ref 0–0.04)
IMM GRANULOCYTES NFR BLD AUTO: 0.2 % (ref 0–0.5)
IRON SERPL-MCNC: 35 UG/DL (ref 30–160)
LYMPHOCYTES # BLD AUTO: 2.1 K/UL (ref 1–4.8)
LYMPHOCYTES NFR BLD: 22.4 % (ref 18–48)
MCH RBC QN AUTO: 28.1 PG (ref 27–31)
MCHC RBC AUTO-ENTMCNC: 31.4 G/DL (ref 32–36)
MCV RBC AUTO: 89 FL (ref 82–98)
MONOCYTES # BLD AUTO: 0.4 K/UL (ref 0.3–1)
MONOCYTES NFR BLD: 4.4 % (ref 4–15)
NEUTROPHILS # BLD AUTO: 6.6 K/UL (ref 1.8–7.7)
NEUTROPHILS NFR BLD: 71.7 % (ref 38–73)
NRBC BLD-RTO: 0 /100 WBC
PLATELET # BLD AUTO: 439 K/UL (ref 150–450)
PMV BLD AUTO: 9.5 FL (ref 9.2–12.9)
RBC # BLD AUTO: 4.31 M/UL (ref 4–5.4)
SATURATED IRON: 8 % (ref 20–50)
TOTAL IRON BINDING CAPACITY: 419 UG/DL (ref 250–450)
TRANSFERRIN SERPL-MCNC: 283 MG/DL (ref 200–375)
WBC # BLD AUTO: 9.25 K/UL (ref 3.9–12.7)

## 2024-04-30 PROCEDURE — 82728 ASSAY OF FERRITIN: CPT

## 2024-04-30 PROCEDURE — 99213 OFFICE O/P EST LOW 20 MIN: CPT | Mod: PBBFAC

## 2024-04-30 PROCEDURE — 83540 ASSAY OF IRON: CPT

## 2024-04-30 PROCEDURE — 85025 COMPLETE CBC W/AUTO DIFF WBC: CPT

## 2024-04-30 PROCEDURE — 36415 COLL VENOUS BLD VENIPUNCTURE: CPT

## 2024-04-30 PROCEDURE — 99999 PR PBB SHADOW E&M-EST. PATIENT-LVL III: CPT | Mod: PBBFAC,,,

## 2024-04-30 PROCEDURE — 0503F POSTPARTUM CARE VISIT: CPT | Mod: ,,,

## 2024-04-30 NOTE — PROGRESS NOTES
"CC: Post-partum follow-up    Bety Cristobal is a 38 y.o. female  who presents for post-partum visit.  She is S/P a LTCS 3/19/24 complicated by PP hemorrhage, received 2 units of pRBC .  She and the baby are doing well. Some pain at times- ibuprofen PRN. No fever.  No bladder complaints. Is taking Mirilax and colace. Does report hemorrhoids. Reports vaginal bleeding had stopped, but started again. She is taking OCPs. No longer breastfeeding- son has milk allergy. Has not resumed intercourse since delivery. Does report fatigue- would like CBC checked.     Delivery Date: 3/19/24  Delivery MD: Dr. Brooke  Gender: male  Breast Feeding: NO  Depression: NO  Contraception: oral contraceptives (estrogen/progesterone)    Pregnancy was complicated by:  h/o CS x 1, HSV, AMA.     BP (!) 125/90   Ht 5' 6" (1.676 m)   Wt 71.4 kg (157 lb 6.5 oz)   LMP 2023 (Exact Date)   Breastfeeding No   BMI 25.41 kg/m²     ROS:  GENERAL: No fever, chills, fatigability.  VULVAR: No pain, no lesions and no itching.  VAGINAL: No relaxation, no itching, no discharge, no abnormal bleeding and no lesions.  ABDOMEN: No abdominal pain. Denies nausea. Denies vomiting. No diarrhea. No constipation  BREAST: Denies pain. No lumps.  URINARY: No incontinence, no nocturia, no frequency and no dysuria.  CARDIOVASCULAR: No chest pain. No shortness of breath. No leg cramps.  NEUROLOGICAL: No headaches. No vision changes.    PHYSICAL EXAM:  AFFECT: Calm, alert and oriented X 3. Interactive during exam  GENERAL: Appears well-nourished, well-developed, in no acute distress.  HEAD: Normocephalic, atraumatic  SKIN: Normal for race, warm, & dry. No lesions or rashes.  LUNGS: Easy and unlabored  HEART: Regular rate   ABDOMEN: Soft and nontender without masses or organomegally.  VULVA: No lesions, masses or tenderness.  VAGINA: Moist and well rugated without lesions or discharge. Light bleeding, no clots noted  CERVIX: Moist and pink without " Shirley Boyd was seen and treated in our emergency department on 9/17/2023. Diagnosis:     Aidy  . She may return on this date:     Excused on 9/18     If you have any questions or concerns, please don't hesitate to call.       Perez Bill MD    ______________________________           _______________          _______________  Hospital Representative                              Date                                Time lesions, discharge or tenderness.      UTERUS: non tender and without masses.  EXTREMITIES: No cyanosis, clubbing or edema. No calf tenderness.  PERINEUM/ ANUS: small hemorrhoid- no thrombosis or bleeding    PLAN:  - Pap up to date  - Check CBC and iron panel today  - Continue OCPs   - Discussed hemorrhoid remedies- will reach out if she wants referral to colorectal     Follow up for annual       DIANA Llanos  OBELAN

## 2024-05-06 ENCOUNTER — PATIENT MESSAGE (OUTPATIENT)
Dept: UROLOGY | Facility: CLINIC | Age: 39
End: 2024-05-06
Payer: OTHER GOVERNMENT

## 2024-05-15 ENCOUNTER — TELEPHONE (OUTPATIENT)
Dept: UROLOGY | Facility: HOSPITAL | Age: 39
End: 2024-05-15
Payer: OTHER GOVERNMENT

## 2024-05-15 NOTE — TELEPHONE ENCOUNTER
Called patient regarding concerns about refills for K citrate.     Patient discussed her concerns started in October when she missed the window to refill her medication and experienced issues with refills. Review of the chart shows refills, were provided with each patient request. Her most recent medication was cancelled by another provider in another office and she has since delivered a baby.     She was informed that her urine pH should be checked which is standard of care while on this medication to ensure the correct dose is not causing elevated urinary pH levels.     She expressed that K citrate is a simple medication and has ultimately has declined follow up. She notes she has plans to follow up with PCP soon she plans to followup with a  referral to a new urologist since she is moving.     Message forwarded to PCP to keep them in the loop as well.

## 2024-06-05 ENCOUNTER — LAB VISIT (OUTPATIENT)
Dept: LAB | Facility: HOSPITAL | Age: 39
End: 2024-06-05
Attending: FAMILY MEDICINE
Payer: OTHER GOVERNMENT

## 2024-06-05 ENCOUNTER — OFFICE VISIT (OUTPATIENT)
Dept: FAMILY MEDICINE | Facility: CLINIC | Age: 39
End: 2024-06-05
Payer: OTHER GOVERNMENT

## 2024-06-05 VITALS
HEART RATE: 77 BPM | TEMPERATURE: 98 F | DIASTOLIC BLOOD PRESSURE: 80 MMHG | BODY MASS INDEX: 25.68 KG/M2 | HEIGHT: 66 IN | SYSTOLIC BLOOD PRESSURE: 112 MMHG | WEIGHT: 159.81 LBS | RESPIRATION RATE: 18 BRPM

## 2024-06-05 DIAGNOSIS — H04.123 DRY EYES: ICD-10-CM

## 2024-06-05 DIAGNOSIS — Z87.442 HISTORY OF KIDNEY STONES: ICD-10-CM

## 2024-06-05 DIAGNOSIS — B07.9 VIRAL WARTS, UNSPECIFIED TYPE: ICD-10-CM

## 2024-06-05 DIAGNOSIS — Z87.442 HISTORY OF KIDNEY STONES: Primary | ICD-10-CM

## 2024-06-05 LAB
ANION GAP SERPL CALC-SCNC: 6 MMOL/L (ref 8–16)
BUN SERPL-MCNC: 8 MG/DL (ref 6–20)
CALCIUM SERPL-MCNC: 9.1 MG/DL (ref 8.7–10.5)
CHLORIDE SERPL-SCNC: 106 MMOL/L (ref 95–110)
CO2 SERPL-SCNC: 25 MMOL/L (ref 23–29)
CREAT SERPL-MCNC: 0.8 MG/DL (ref 0.5–1.4)
EST. GFR  (NO RACE VARIABLE): >60 ML/MIN/1.73 M^2
GLUCOSE SERPL-MCNC: 84 MG/DL (ref 70–110)
POTASSIUM SERPL-SCNC: 4.8 MMOL/L (ref 3.5–5.1)
SODIUM SERPL-SCNC: 137 MMOL/L (ref 136–145)

## 2024-06-05 PROCEDURE — 80048 BASIC METABOLIC PNL TOTAL CA: CPT | Performed by: FAMILY MEDICINE

## 2024-06-05 PROCEDURE — 99213 OFFICE O/P EST LOW 20 MIN: CPT | Mod: PBBFAC,PO | Performed by: FAMILY MEDICINE

## 2024-06-05 PROCEDURE — 36415 COLL VENOUS BLD VENIPUNCTURE: CPT | Mod: PO | Performed by: FAMILY MEDICINE

## 2024-06-05 PROCEDURE — 99214 OFFICE O/P EST MOD 30 MIN: CPT | Mod: S$PBB,,, | Performed by: FAMILY MEDICINE

## 2024-06-05 PROCEDURE — 99999 PR PBB SHADOW E&M-EST. PATIENT-LVL III: CPT | Mod: PBBFAC,,, | Performed by: FAMILY MEDICINE

## 2024-06-05 RX ORDER — POTASSIUM CITRATE 15 MEQ/1
1 TABLET, EXTENDED RELEASE ORAL DAILY
Qty: 90 TABLET | Refills: 3 | Status: SHIPPED | OUTPATIENT
Start: 2024-06-05 | End: 2025-06-05

## 2024-06-05 NOTE — PROGRESS NOTES
Subjective:       Patient ID: Bety Cristobal is a 39 y.o. female.    Chief Complaint: Annual Exam      HPI  39-year-old female presents for history of kidney stones.  States she needs a refill on her potassium citrate medication.  States it was accidentally canceled she is having hard time refill her medication.  States she has eye irritation since she delivered her  about 2 months ago.  She has warts on her hands and would like a dermatology referral.    Review of Systems   Constitutional: Negative.    HENT: Negative.     Respiratory: Negative.     Cardiovascular: Negative.    Gastrointestinal: Negative.    Endocrine: Negative.    Genitourinary: Negative.    Musculoskeletal: Negative.    Neurological: Negative.    Psychiatric/Behavioral: Negative.            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     Kidney stones      Past Surgical History:   Procedure Laterality Date     SECTION       SECTION N/A 3/19/2024    Procedure:  SECTION;  Surgeon: Vika Brooke DO;  Location: [facility];  Service: OB/GYN;  Laterality: N/A;     Family History   Problem Relation Name Age of Onset    Hypertension Mother      Coronary artery disease Mother          stent    Kidney disease Father          kidney stones    No Known Problems Sister      Breast cancer Neg Hx      Ovarian cancer Neg Hx      Colon cancer Neg Hx       Social History     Socioeconomic History    Marital status:     Number of children: 1   Occupational History    Occupation:      Comment: planner, works at [employer]   Tobacco Use    Smoking status: Never    Smokeless tobacco: Never   Substance and Sexual Activity    Alcohol use: Yes     Alcohol/week: 1.0 standard drink of alcohol     Types: 1 Glasses of wine per week     Comment: socially    Drug use: Never    Sexual activity: Yes     Partners: Male     Birth control/protection: None  "  Social History Narrative    [employer] son 7 months.    [employer].    Dairy allergy.       Current Outpatient Medications:     docusate sodium (COLACE) 100 MG capsule, Take 2 capsules (200 mg total) by mouth 2 (two) times daily. (Patient not taking: Reported on 6/5/2024), Disp: 30 capsule, Rfl: 0    ferrous sulfate 325 (65 FE) MG EC tablet, Take 1 tablet (325 mg total) by mouth once daily. (Patient not taking: Reported on 6/5/2024), Disp: 30 tablet, Rfl: 0    norgestimate-ethinyl estradioL (TRI-LO-SPRINTEC) 0.18/0.215/0.25 mg-25 mcg tablet, Take 1 tablet by mouth once daily. (Patient not taking: Reported on 6/5/2024), Disp: 84 tablet, Rfl: 3    potassium citrate (UROCIT-K 15) 15 mEq TbSR, Take 1 tablet by mouth once daily., Disp: 90 tablet, Rfl: 3   Objective:      Vitals:    06/05/24 0920   BP: 112/80   BP Location: Left arm   Patient Position: Sitting   BP Method: Medium (Automatic)   Pulse: 77   Resp: 18   Temp: 98.4 °F (36.9 °C)   TempSrc: Oral   Weight: 72.5 kg (159 lb 13.3 oz)   Height: 5' 6" (1.676 m)       Physical Exam  Constitutional:       General: She is not in acute distress.     Appearance: She is not diaphoretic.   HENT:      Head: Normocephalic and atraumatic.   Eyes:      Conjunctiva/sclera:      Right eye: Right conjunctiva is injected.      Left eye: Left conjunctiva is injected.   Pulmonary:      Effort: Pulmonary effort is normal.   Musculoskeletal:      Cervical back: Neck supple.   Skin:     Findings: Lesion (warts on hand) present.   Neurological:      Mental Status: She is alert and oriented to person, place, and time.   Psychiatric:         Behavior: Behavior normal.         Thought Content: Thought content normal.         Judgment: Judgment normal.            Assessment:       1. History of kidney stones    2. Viral warts, unspecified type    3. Dry eyes        Plan:       History of kidney stones  -     potassium citrate (UROCIT-K 15) 15 mEq TbSR; Take 1 tablet by mouth once daily.  " Dispense: 90 tablet; Refill: 3  -     Urinalysis; Future; Expected date: 06/05/2024  -     BASIC METABOLIC PANEL; Future; Expected date: 06/05/2024    Viral warts, unspecified type  -     Ambulatory referral/consult to Dermatology; Future; Expected date: 06/12/2024    Dry eyes      Refilled medications.  Follow up labs and urine test.  For patient to Dermatology.  Advised patient to use OTC artificial tears along with warm compresses.  Patient is not nursing and can try oral antihistamines.          No future appointments.    Patient note was created using [employer].  Any errors in syntax or even information may not have been identified and edited on initial review prior to signing this note.

## 2024-06-26 ENCOUNTER — TELEPHONE (OUTPATIENT)
Dept: OBSTETRICS AND GYNECOLOGY | Facility: CLINIC | Age: 39
End: 2024-06-26
Payer: OTHER GOVERNMENT

## 2024-06-26 NOTE — TELEPHONE ENCOUNTER
Scheduled soonest appointment, added to waitlist    ----- Message from Arnel Joshi sent at 6/26/2024  8:43 AM CDT -----  Regarding: Self [phone]  Type:  Sooner Appointment Request    Patient is requesting a sooner appointment.  Patient declined first available appointment listed as well as another facility and provider .  Patient will not accept being placed on the waitlist and is requesting a message be sent to doctor.    Name of Caller:  Self     When is the first available appointment?  None available     Symptoms:  wwe    Would the patient rather a call back or a response via [facility]?  Call back     Best Call Back Number:  [phone]     Additional Information:

## 2024-07-03 ENCOUNTER — LAB VISIT (OUTPATIENT)
Dept: LAB | Facility: HOSPITAL | Age: 39
End: 2024-07-03
Payer: OTHER GOVERNMENT

## 2024-07-03 ENCOUNTER — E-CONSULT (OUTPATIENT)
Dept: GASTROENTEROLOGY | Facility: CLINIC | Age: 39
End: 2024-07-03
Payer: OTHER GOVERNMENT

## 2024-07-03 ENCOUNTER — OFFICE VISIT (OUTPATIENT)
Dept: FAMILY MEDICINE | Facility: CLINIC | Age: 39
End: 2024-07-03
Payer: OTHER GOVERNMENT

## 2024-07-03 VITALS
OXYGEN SATURATION: 99 % | WEIGHT: 158.75 LBS | HEIGHT: 66 IN | RESPIRATION RATE: 18 BRPM | DIASTOLIC BLOOD PRESSURE: 78 MMHG | TEMPERATURE: 98 F | SYSTOLIC BLOOD PRESSURE: 134 MMHG | BODY MASS INDEX: 25.51 KG/M2 | HEART RATE: 85 BPM

## 2024-07-03 DIAGNOSIS — R10.13 EPIGASTRIC PAIN: Primary | ICD-10-CM

## 2024-07-03 DIAGNOSIS — D62 ACUTE BLOOD LOSS ANEMIA: ICD-10-CM

## 2024-07-03 DIAGNOSIS — N20.0 KIDNEY STONES: ICD-10-CM

## 2024-07-03 DIAGNOSIS — R10.13 EPIGASTRIC PAIN: ICD-10-CM

## 2024-07-03 DIAGNOSIS — R10.9 ABDOMINAL PAIN, UNSPECIFIED ABDOMINAL LOCATION: Primary | ICD-10-CM

## 2024-07-03 LAB
ALBUMIN SERPL BCP-MCNC: 3.6 G/DL (ref 3.5–5.2)
ALP SERPL-CCNC: 37 U/L (ref 55–135)
ALT SERPL W/O P-5'-P-CCNC: 21 U/L (ref 10–44)
AMYLASE SERPL-CCNC: 33 U/L (ref 20–110)
ANION GAP SERPL CALC-SCNC: 8 MMOL/L (ref 8–16)
AST SERPL-CCNC: 22 U/L (ref 10–40)
BASOPHILS # BLD AUTO: 0.04 K/UL (ref 0–0.2)
BASOPHILS NFR BLD: 0.4 % (ref 0–1.9)
BILIRUB SERPL-MCNC: 0.2 MG/DL (ref 0.1–1)
BUN SERPL-MCNC: 8 MG/DL (ref 6–20)
CALCIUM SERPL-MCNC: 9.6 MG/DL (ref 8.7–10.5)
CHLORIDE SERPL-SCNC: 106 MMOL/L (ref 95–110)
CHOLEST SERPL-MCNC: 194 MG/DL (ref 120–199)
CHOLEST/HDLC SERPL: 3.4 {RATIO} (ref 2–5)
CO2 SERPL-SCNC: 24 MMOL/L (ref 23–29)
CREAT SERPL-MCNC: 0.8 MG/DL (ref 0.5–1.4)
DIFFERENTIAL METHOD BLD: ABNORMAL
EOSINOPHIL # BLD AUTO: 0.1 K/UL (ref 0–0.5)
EOSINOPHIL NFR BLD: 0.8 % (ref 0–8)
ERYTHROCYTE [DISTWIDTH] IN BLOOD BY AUTOMATED COUNT: 13 % (ref 11.5–14.5)
EST. GFR  (NO RACE VARIABLE): >60 ML/MIN/1.73 M^2
GLUCOSE SERPL-MCNC: 88 MG/DL (ref 70–110)
HCT VFR BLD AUTO: 39 % (ref 37–48.5)
HDLC SERPL-MCNC: 57 MG/DL (ref 40–75)
HDLC SERPL: 29.4 % (ref 20–50)
HGB BLD-MCNC: 12.3 G/DL (ref 12–16)
IMM GRANULOCYTES # BLD AUTO: 0.02 K/UL (ref 0–0.04)
IMM GRANULOCYTES NFR BLD AUTO: 0.2 % (ref 0–0.5)
LDLC SERPL CALC-MCNC: 114.8 MG/DL (ref 63–159)
LIPASE SERPL-CCNC: 22 U/L (ref 4–60)
LYMPHOCYTES # BLD AUTO: 2.8 K/UL (ref 1–4.8)
LYMPHOCYTES NFR BLD: 29.2 % (ref 18–48)
MCH RBC QN AUTO: 27.6 PG (ref 27–31)
MCHC RBC AUTO-ENTMCNC: 31.5 G/DL (ref 32–36)
MCV RBC AUTO: 88 FL (ref 82–98)
MONOCYTES # BLD AUTO: 0.6 K/UL (ref 0.3–1)
MONOCYTES NFR BLD: 6.3 % (ref 4–15)
NEUTROPHILS # BLD AUTO: 6 K/UL (ref 1.8–7.7)
NEUTROPHILS NFR BLD: 63.1 % (ref 38–73)
NONHDLC SERPL-MCNC: 137 MG/DL
NRBC BLD-RTO: 0 /100 WBC
PLATELET # BLD AUTO: 344 K/UL (ref 150–450)
PMV BLD AUTO: 10.8 FL (ref 9.2–12.9)
POTASSIUM SERPL-SCNC: 4 MMOL/L (ref 3.5–5.1)
PROT SERPL-MCNC: 7.5 G/DL (ref 6–8.4)
RBC # BLD AUTO: 4.45 M/UL (ref 4–5.4)
SODIUM SERPL-SCNC: 138 MMOL/L (ref 136–145)
TRIGL SERPL-MCNC: 111 MG/DL (ref 30–150)
WBC # BLD AUTO: 9.58 K/UL (ref 3.9–12.7)

## 2024-07-03 PROCEDURE — 99999 PR PBB SHADOW E&M-EST. PATIENT-LVL IV: CPT | Mod: PBBFAC,,,

## 2024-07-03 PROCEDURE — 80061 LIPID PANEL: CPT

## 2024-07-03 PROCEDURE — 99214 OFFICE O/P EST MOD 30 MIN: CPT | Mod: PBBFAC,PO

## 2024-07-03 PROCEDURE — 83690 ASSAY OF LIPASE: CPT

## 2024-07-03 PROCEDURE — 80053 COMPREHEN METABOLIC PANEL: CPT

## 2024-07-03 PROCEDURE — 36415 COLL VENOUS BLD VENIPUNCTURE: CPT | Mod: PO

## 2024-07-03 PROCEDURE — 82150 ASSAY OF AMYLASE: CPT

## 2024-07-03 PROCEDURE — 85025 COMPLETE CBC W/AUTO DIFF WBC: CPT

## 2024-07-03 RX ORDER — ONDANSETRON 4 MG/1
4 TABLET, FILM COATED ORAL EVERY 8 HOURS PRN
Qty: 30 TABLET | Refills: 0 | Status: SHIPPED | OUTPATIENT
Start: 2024-07-03

## 2024-07-03 RX ORDER — ONDANSETRON 4 MG/1
TABLET, FILM COATED ORAL
COMMUNITY
Start: 2024-06-19

## 2024-07-03 RX ORDER — DICYCLOMINE HYDROCHLORIDE 10 MG/1
10 CAPSULE ORAL 4 TIMES DAILY
COMMUNITY
Start: 2024-06-21

## 2024-07-03 NOTE — CONSULTS
[facility]  Response for E-Consult     Patient Name: Bety Cristobal  MRN: [id number]  Primary Care Provider: Leigh Zepeda MD   Requesting Provider: Darwin Morton*  E-Consult to Gastroenterology  Consult performed by: Mirian Nunez MD  Consult ordered by: Naomi Morton PA-C          Recommendation: If blood work unremarkable, please send request for EGD     Contingency that warrants a repeat eConsult or referral: N/A     Total time of Consultation: 10 minute    I did not speak to the requesting provider verbally about this.     *This eConsult is based on the clinical data available to me and is furnished without benefit of a physical examination. The eConsult will need to be interpreted in light of any clinical issues or changes in patient status not available to me at the time of filing this eConsults. Significant changes in patient condition or level of acuity should result in immediate formal consultation and reevaluation. Please alert me if you have further questions.    Thank you for this eConsult referral.       MD [facility]

## 2024-07-03 NOTE — PROGRESS NOTES
HPI     Chief Complaint:  Chief Complaint   Patient presents with    Follow-up       Bety Cristobal is a 39 y.o. female with multiple medical diagnoses as listed in the medical history and problem list that presents for ER follow up    [facility] [place]. [place]: [facility] 2024    Follow-up  Episode frequency: had 3 events like this. The problem has been waxing and waning. Pertinent negatives include no abdominal pain, change in bowel habit, chest pain, chills, fever, headaches, myalgias, nausea or vomiting. Nothing aggravates the symptoms. She has tried nothing for the symptoms.     Diet:  specific meal or food that starts the event   Had a   2024   Had to have a blood transfusion after her baby was taken iron but now not taken any iron in her bowel movements are not dark anymore    Epigastic pain- 3 times in 2024, 1  time per week.   denies fever,  diarrhea or constipation    Relief from zofran in ER          Assessment & Plan      Refilling Zofran as patient did have some relief in the emergency room last time from this epigastric pain.  The consulting GI   Advised to drink plenty of water 4-5  16 oz bottles per day   Advised to monitor her bowel movement  epigastric pain events.  Follow up as needed in seek emergency room, if necessary   Provided information on AVS    Problem List Items Addressed This Visit          Renal/    Kidney stones- takes citrate, hydrates    See above       Oncology    Acute blood loss anemia   See above       GI    Epigastric pain - Primary    Relevant Medications    ondansetron (ZOFRAN) 4 MG tablet    Other Relevant Orders    CBC Auto Differential    Comprehensive Metabolic Panel    Amylase    Lipase    LIPID PANEL    E-Consult to Gastroenterology (Completed)         --------------------------------------------      Health Maintenance:  Health Maintenance         Date Due Completion Date    Influenza Vaccine (1) 2024     "Hemoglobin A1c (Diabetic Prevention Screening) 10/06/2025 10/6/2022    Cervical Cancer Screening 2028    TETANUS VACCINE 2034            Health maintenance reviewed    Follow Up:  Follow up if symptoms worsen or fail to improve.    Exam     Review of Systems:  (as noted above)  Review of Systems   Constitutional:  Negative for chills and fever.   Respiratory:  Negative for shortness of breath.    Cardiovascular:  Negative for chest pain.   Gastrointestinal:  Negative for abdominal pain, change in bowel habit, nausea and vomiting.   Genitourinary:  Negative for difficulty urinating.   Musculoskeletal:  Negative for myalgias.   Neurological:  Negative for dizziness and headaches.       Physical Exam:   Physical Exam  Constitutional:       General: She is not in acute distress.     Appearance: Normal appearance. She is not ill-appearing, toxic-appearing or diaphoretic.   HENT:      Head: Normocephalic and atraumatic.   Cardiovascular:      Rate and Rhythm: Normal rate and regular rhythm.      Pulses: Normal pulses.      Heart sounds: No murmur heard.  Pulmonary:      Effort: Pulmonary effort is normal. No respiratory distress.   Neurological:      Mental Status: She is alert and oriented to person, place, and time.   Psychiatric:         Mood and Affect: Mood normal.       Vitals:    24 1348   BP: 134/78   BP Location: Right arm   Patient Position: Sitting   BP Method: Small (Manual)   Pulse: 85   Resp: 18   Temp: 98.4 °F (36.9 °C)   TempSrc: Oral   SpO2: 99%   Weight: 72 kg (158 lb 11.7 oz)   Height: 5' 6" (1.676 m)      Body mass index is 25.62 kg/m².        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     Kidney stones        Past Surgical History:  Past Surgical History:   Procedure Laterality Date     SECTION       SECTION N/A 3/19/2024    Procedure:  SECTION;  " Surgeon: Vika Brooke DO;  Location: [facility] L&D;  Service: OB/GYN;  Laterality: N/A;       Social History:  Social History     Socioeconomic History    Marital status:     Number of children: 1   Occupational History    Occupation:      Comment: planner, works at [employer]   Tobacco Use    Smoking status: Never    Smokeless tobacco: Never   Substance and Sexual Activity    Alcohol use: Yes     Alcohol/week: 1.0 standard drink of alcohol     Types: 1 Glasses of wine per week     Comment: socially    Drug use: Never    Sexual activity: Yes     Partners: Male     Birth control/protection: None   Social History Narrative    Elec son 7 months.    [employer].    Dairy allergy.       Family History:  Family History   Problem Relation Name Age of Onset    Hypertension Mother      Coronary artery disease Mother          stent    Kidney disease Father          kidney stones    No Known Problems Sister      Breast cancer Neg Hx      Ovarian cancer Neg Hx      Colon cancer Neg Hx         Allergies and Medications: (updated and reviewed)  Review of patient's allergies indicates:   Allergen Reactions    Doxycycline Anaphylaxis     esopahgeal spasms     Current Outpatient Medications   Medication Sig Dispense Refill    dicyclomine (BENTYL) 10 MG capsule Take 10 mg by mouth 4 (four) times daily.      norgestimate-ethinyl estradioL (TRI-LO-SPRINTEC) 0.18/0.215/0.25 mg-25 mcg tablet Take 1 tablet by mouth once daily. 84 tablet 3    ondansetron (ZOFRAN) 4 MG tablet       potassium citrate (UROCIT-K 15) 15 mEq TbSR Take 1 tablet by mouth once daily. 90 tablet 3    docusate sodium (COLACE) 100 MG capsule Take 2 capsules (200 mg total) by mouth 2 (two) times daily. (Patient not taking: Reported on 6/5/2024) 30 capsule 0    ferrous sulfate 325 (65 FE) MG EC tablet Take 1 tablet (325 mg total) by mouth once daily. (Patient not taking: Reported on 6/5/2024) 30 tablet 0    ondansetron (ZOFRAN) 4 MG tablet  Take 1 tablet (4 mg total) by mouth every 8 (eight) hours as needed for Nausea. 30 tablet 0     No current facility-administered medications for this visit.       Patient Care Team:  Leigh Zepeda MD as PCP - General (Family Medicine)         - The patient is given an After Visit Summary that lists all medications with directions, allergies, education, orders placed during this encounter and follow-up instructions.      - I have reviewed the patient's medical information including past medical, family, and social history sections including the medications and allergies.      - We discussed the patient's current medications.     This note was created by combination of typed  and MModal dictation.  Transcription errors may be present.  If there are any questions, please contact me.       Naomi Morton PA-C

## 2024-07-09 ENCOUNTER — PATIENT MESSAGE (OUTPATIENT)
Dept: FAMILY MEDICINE | Facility: CLINIC | Age: 39
End: 2024-07-09
Payer: OTHER GOVERNMENT

## 2024-07-17 ENCOUNTER — PATIENT MESSAGE (OUTPATIENT)
Dept: FAMILY MEDICINE | Facility: CLINIC | Age: 39
End: 2024-07-17
Payer: OTHER GOVERNMENT

## 2024-08-12 ENCOUNTER — TELEPHONE (OUTPATIENT)
Dept: FAMILY MEDICINE | Facility: CLINIC | Age: 39
End: 2024-08-12
Payer: OTHER GOVERNMENT

## 2024-08-12 ENCOUNTER — TELEPHONE (OUTPATIENT)
Dept: ENDOSCOPY | Facility: HOSPITAL | Age: 39
End: 2024-08-12
Payer: OTHER GOVERNMENT

## 2024-08-12 DIAGNOSIS — R11.0 NAUSEA: ICD-10-CM

## 2024-08-12 DIAGNOSIS — R09.82 POSTNASAL DRIP: Primary | ICD-10-CM

## 2024-08-12 DIAGNOSIS — R05.9 COUGH, UNSPECIFIED TYPE: ICD-10-CM

## 2024-08-12 DIAGNOSIS — R09.82 POST-NASAL DRAINAGE: Primary | ICD-10-CM

## 2024-08-12 NOTE — TELEPHONE ENCOUNTER
Pt informed new order placed; provided number to endoscopy so she can get scheduled and maybe see what happened to the previous order

## 2024-08-12 NOTE — TELEPHONE ENCOUNTER
----- Message from [place] sent at 8/12/2024  2:32 PM CDT -----  Regarding: self    Type: Patient Call Back     Who called:self     What is the request in detail:pt was seen on 7/3 and was told a scope was ordered for her and someone would call to schedule it. Pt never received a call so she reached out on the portal and was told again it was in and someone would call, this was on 7/9. As of today the pt still has not received a call to schedule the scope. Please call to advise     Can the clinic reply by [facility]? No     Would the patient rather a call back or a response via [facility]? Call back     Best call back number: [phone]       Additional Information:pt is stating with her  sometimes orders/referrals have a 30 day window and this may need to be resubmitted      Thank you.

## 2024-08-12 NOTE — TELEPHONE ENCOUNTER
,Spoke to pt to schedule procedure(s) Upper Endoscopy (EGD)       Physician to perform procedure(s) Dr. PAT Frazier   Date of Procedure (s) 9/11/24  Arrival Time 1: 00PM  Time of Procedure(s) 2:00 PM   Location of Procedure(s) [address]   Type of Rx Prep sent to patient: N/A  Instructions provided to patient via [facility]    Patient was informed on the following information and verbalized understanding. Screening questionnaire reviewed with patient and complete. If procedure requires anesthesia, a responsible adult needs to be present to accompany the patient home, patient cannot drive after receiving anesthesia. Appointment details are tentative, especially check-in time. Patient will receive a prep-op call 7 days prior to confirm check-in time for procedure. If applicable the patient should contact their pharmacy to verify Rx for procedure prep is ready for pick-up. Patient was advised to call the scheduling department at [phone] if pharmacy states no Rx is available. Patient was advised to call the endoscopy scheduling department if any questions or concerns arise.      [facility] Scheduling Department            Dx: Post-nasal drainage [R09.82 (ICD-10-CM)]; Cough, unspecified type [R05.9 (ICD-10-CM)]; Nausea [R11.0 (ICD-10-CM)]

## 2024-08-12 NOTE — TELEPHONE ENCOUNTER
Looks like it was scheduled for 7/9 and then canceled; doesn't show me who canceled it or who scheduled it; I'm guessing new orders need to be placed

## 2024-08-29 ENCOUNTER — PATIENT MESSAGE (OUTPATIENT)
Dept: DERMATOLOGY | Facility: CLINIC | Age: 39
End: 2024-08-29

## 2024-08-29 ENCOUNTER — OFFICE VISIT (OUTPATIENT)
Dept: DERMATOLOGY | Facility: CLINIC | Age: 39
End: 2024-08-29
Payer: OTHER GOVERNMENT

## 2024-08-29 DIAGNOSIS — B07.9 VERRUCA VULGARIS: Primary | ICD-10-CM

## 2024-08-29 PROCEDURE — 99999 PR PBB SHADOW E&M-EST. PATIENT-LVL III: CPT | Mod: PBBFAC,,, | Performed by: PHYSICIAN ASSISTANT

## 2024-08-29 PROCEDURE — 17110 DESTRUCTION B9 LES UP TO 14: CPT | Mod: PBBFAC | Performed by: PHYSICIAN ASSISTANT

## 2024-08-29 PROCEDURE — 99499 UNLISTED E&M SERVICE: CPT | Mod: S$PBB,,, | Performed by: PHYSICIAN ASSISTANT

## 2024-08-29 PROCEDURE — 99213 OFFICE O/P EST LOW 20 MIN: CPT | Mod: PBBFAC | Performed by: PHYSICIAN ASSISTANT

## 2024-08-29 PROCEDURE — 17110 DESTRUCTION B9 LES UP TO 14: CPT | Mod: S$PBB,,, | Performed by: PHYSICIAN ASSISTANT

## 2024-08-29 NOTE — PATIENT INSTRUCTIONS
At-home Wart treatment (optional):     Recommend home maintenance for wart(s). After nightly soaking (warm water in bath or shower), patient should apply over-the-counter salicylic acid wart patch and cover. Remove in a.m. (or 12 hours later). Repeat nightly except night prior to next appointment.      Do the above treatment on the warts that were treated today.    Avoid picking, scratching, or biting the warts as this may cause them to spread.             CRYOSURGERY      Your doctor has used a method called cryosurgery to treat your skin condition. Cryosurgery refers to the use of very cold substances to treat a variety of skin conditions such as warts, pre-skin cancers, molluscum contagiosum, sun spots, and several benign growths. The substance we use in cryosurgery is liquid nitrogen and is so cold (-195 degrees Celsius) that is burns when administered.     Following treatment in the office, the skin may immediately burn and become red. You may find the area around the lesion is affected as well. It is sometimes necessary to treat not only the lesion, but a small area of the surrounding normal skin to achieve a good response.     A blister, and even a blood filled blister, may form after treatment.   This is a normal response. If the blister is painful, it is acceptable to sterilize a needle and with rubbing alcohol and gently pop the blister. It is important that you gently wash the area with soap and warm water as the blister fluid may contain wart virus if a wart was treated. Do no remove the roof of the blister.     The area treated can take anywhere from 1-3 weeks to heal. Healing time depends on the kind skin lesion treated, the location, and how aggressively the lesion was treated. It is recommended that the areas treated are covered with Vaseline or bacitracin ointment and a band-aid. If a band-aid is not practical, just ointment applied several times per day will do. Keeping these areas moist will speed  the healing time.    Treatment with liquid nitrogen can leave a scar. In dark skin, it may be a light or dark scar, in light skin it may be a white or pink scar. These will generally fade with time, but may never go away completely.     If you have any concerns after your treatment, please feel free to call the office.       [address] [place] [address]/ [phone] FAX/ [email or web address]

## 2024-08-29 NOTE — PROGRESS NOTES
Subjective:      Patient ID:  Bety Cristobal is a 39 y.o. female who presents for   Chief Complaint   Patient presents with    Warts     Right hand     Pt presents for evaluation of warts.     Patient with new area of concern:   Location: right hand   Duration: 2 years  S/S: bleeding when touching   Previous treatments: OTC freezing kit       Review of Systems   Skin:  Negative for itching and rash.       Objective:   Physical Exam   Constitutional: She appears well-developed and well-nourished. No distress.   Neurological: She is alert and oriented to person, place, and time. She is not disoriented.   Psychiatric: She has a normal mood and affect.   Skin:   Areas Examined (abnormalities noted in diagram):   Nails and Digits Inspection Performed                 Diagram Legend     Erythematous scaling macule/papule c/w actinic keratosis       Vascular papule c/w angioma      Pigmented verrucoid papule/plaque c/w seborrheic keratosis      Yellow umbilicated papule c/w sebaceous hyperplasia      Irregularly shaped tan macule c/w lentigo     1-2 mm smooth white papules consistent with Milia      Movable subcutaneous cyst with punctum c/w epidermal inclusion cyst      Subcutaneous movable cyst c/w pilar cyst      Firm pink to brown papule c/w dermatofibroma      Pedunculated fleshy papule(s) c/w skin tag(s)      Evenly pigmented macule c/w junctional nevus     Mildly variegated pigmented, slightly irregular-bordered macule c/w mildly atypical nevus      Flesh colored to evenly pigmented papule c/w intradermal nevus       Pink pearly papule/plaque c/w basal cell carcinoma      Erythematous hyperkeratotic cursted plaque c/w SCC      Surgical scar with no sign of skin cancer recurrence      Open and closed comedones      Inflammatory papules and pustules      Verrucoid papule consistent consistent with wart     Erythematous eczematous patches and plaques     Dystrophic onycholytic nail with subungual debris c/w  onychomycosis     Umbilicated papule    Erythematous-base heme-crusted tan verrucoid plaque consistent with inflamed seborrheic keratosis     Erythematous Silvery Scaling Plaque c/w Psoriasis     See annotation      Assessment / Plan:        Verruca vulgaris- right hand, first cycle  -     Ambulatory referral/consult to Dermatology    Cryosurgery procedure note:    Verbal consent from the patient is obtained including, but not limited to, risk of hypopigmentation/hyperpigmentation, scar, recurrence of lesion. Liquid nitrogen cryosurgery is applied to 2 verruca with prior paring, as detailed in the physical exam, to produce a freeze injury. 2 consecutive freeze thaw cycles are applied to each verruca. The patient is aware that blisters (possibly blood blisters) may form.       Recommended at-home wart treatment in-between if she would like to.    Follow up in about 3 weeks (around 9/19/2024).

## 2024-09-04 ENCOUNTER — TELEPHONE (OUTPATIENT)
Dept: ENDOSCOPY | Facility: HOSPITAL | Age: 39
End: 2024-09-04
Payer: OTHER GOVERNMENT

## 2024-09-04 NOTE — TELEPHONE ENCOUNTER
Spoke to patient for pre-call to confirm scheduled Upper Endoscopy (EGD) and patient verbalized understanding of the following:       Date & arrival time of procedure(s) verified 9/11/24, 1:00 PM.  Location of procedure(s) [address]  verified.  NPO status reinforced. Ok to continue clear liquids until 10:00 AM.   Patient denies use of blood thinners, GLP-1 medications, and weight loss medications.  Patient confirmed receipt of prep instructions.  Instructions provided to patient via [facility].  Patient confirmed ride home after procedure if procedure requires anesthesia.   Pre-call screening questionnaire reviewed and completed with patient.   Appointment details are tentative, including check-in time.  If the patient begins taking any blood thinning medications, injectable weight loss/diabetes medications (other than insulin), or Adipex (phentermine) patient was instructed to contact the endoscopy scheduling department as soon as possible.  Patient was advised to call the endoscopy scheduling department if any questions or concerns arise.      Endoscopy Scheduling Department

## 2024-09-12 ENCOUNTER — PATIENT MESSAGE (OUTPATIENT)
Dept: ENDOSCOPY | Facility: HOSPITAL | Age: 39
End: 2024-09-12
Payer: OTHER GOVERNMENT

## 2024-09-12 ENCOUNTER — TELEPHONE (OUTPATIENT)
Dept: ENDOSCOPY | Facility: HOSPITAL | Age: 39
End: 2024-09-12
Payer: OTHER GOVERNMENT
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
## 2024-09-16 ENCOUNTER — OFFICE VISIT (OUTPATIENT)
Dept: OBSTETRICS AND GYNECOLOGY | Facility: CLINIC | Age: 39
End: 2024-09-16
Attending: OBSTETRICS & GYNECOLOGY
Payer: OTHER GOVERNMENT

## 2024-09-16 VITALS
HEIGHT: 66 IN | DIASTOLIC BLOOD PRESSURE: 86 MMHG | WEIGHT: 152.56 LBS | BODY MASS INDEX: 24.52 KG/M2 | SYSTOLIC BLOOD PRESSURE: 122 MMHG

## 2024-09-16 DIAGNOSIS — Z30.011 ENCOUNTER FOR INITIAL PRESCRIPTION OF CONTRACEPTIVE PILLS: ICD-10-CM

## 2024-09-16 DIAGNOSIS — Z01.419 WELL WOMAN EXAM WITH ROUTINE GYNECOLOGICAL EXAM: Primary | ICD-10-CM

## 2024-09-16 PROCEDURE — 99213 OFFICE O/P EST LOW 20 MIN: CPT | Mod: PBBFAC,PO | Performed by: OBSTETRICS & GYNECOLOGY

## 2024-09-16 PROCEDURE — 99395 PREV VISIT EST AGE 18-39: CPT | Mod: S$PBB,,, | Performed by: OBSTETRICS & GYNECOLOGY

## 2024-09-16 PROCEDURE — 99999 PR PBB SHADOW E&M-EST. PATIENT-LVL III: CPT | Mod: PBBFAC,,, | Performed by: OBSTETRICS & GYNECOLOGY

## 2024-09-18 ENCOUNTER — OFFICE VISIT (OUTPATIENT)
Dept: DERMATOLOGY | Facility: CLINIC | Age: 39
End: 2024-09-18
Payer: OTHER GOVERNMENT

## 2024-09-18 DIAGNOSIS — B07.9 VERRUCA VULGARIS: Primary | ICD-10-CM

## 2024-09-18 PROCEDURE — 99999 PR PBB SHADOW E&M-EST. PATIENT-LVL III: CPT | Mod: PBBFAC,,, | Performed by: PHYSICIAN ASSISTANT

## 2024-09-18 PROCEDURE — 17110 DESTRUCTION B9 LES UP TO 14: CPT | Mod: S$PBB,,, | Performed by: PHYSICIAN ASSISTANT

## 2024-09-18 PROCEDURE — 99499 UNLISTED E&M SERVICE: CPT | Mod: S$PBB,,, | Performed by: PHYSICIAN ASSISTANT

## 2024-09-18 PROCEDURE — 17110 DESTRUCTION B9 LES UP TO 14: CPT | Mod: PBBFAC | Performed by: PHYSICIAN ASSISTANT

## 2024-09-18 PROCEDURE — 99213 OFFICE O/P EST LOW 20 MIN: CPT | Mod: PBBFAC | Performed by: PHYSICIAN ASSISTANT

## 2024-09-18 NOTE — PROGRESS NOTES
Subjective:      Patient ID:  Bety Cristobal is a 39 y.o. female who presents for   Chief Complaint   Patient presents with    Follow-up     Wart - right hand     Pt came present for follow up on wart on right hand. She reports that she previously got blood blisters of both warts that were previously treated. She didn't do any at-home treatments. She does feel like they are regressing.       Review of Systems   Skin:  Negative for itching and rash.       Objective:   Physical Exam   Constitutional: She appears well-developed and well-nourished. No distress.   Neurological: She is alert and oriented to person, place, and time. She is not disoriented.   Psychiatric: She has a normal mood and affect.   Skin:   Areas Examined (abnormalities noted in diagram):   Nails and Digits Inspection Performed      *No pictures were taken today as there was not a significant difference.      Diagram Legend     Erythematous scaling macule/papule c/w actinic keratosis       Vascular papule c/w angioma      Pigmented verrucoid papule/plaque c/w seborrheic keratosis      Yellow umbilicated papule c/w sebaceous hyperplasia      Irregularly shaped tan macule c/w lentigo     1-2 mm smooth white papules consistent with Milia      Movable subcutaneous cyst with punctum c/w epidermal inclusion cyst      Subcutaneous movable cyst c/w pilar cyst      Firm pink to brown papule c/w dermatofibroma      Pedunculated fleshy papule(s) c/w skin tag(s)      Evenly pigmented macule c/w junctional nevus     Mildly variegated pigmented, slightly irregular-bordered macule c/w mildly atypical nevus      Flesh colored to evenly pigmented papule c/w intradermal nevus       Pink pearly papule/plaque c/w basal cell carcinoma      Erythematous hyperkeratotic cursted plaque c/w SCC      Surgical scar with no sign of skin cancer recurrence      Open and closed comedones      Inflammatory papules and pustules      Verrucoid papule consistent consistent  with wart     Erythematous eczematous patches and plaques     Dystrophic onycholytic nail with subungual debris c/w onychomycosis     Umbilicated papule    Erythematous-base heme-crusted tan verrucoid plaque consistent with inflamed seborrheic keratosis     Erythematous Silvery Scaling Plaque c/w Psoriasis     See annotation      Assessment / Plan:        Verruca vulgaris  Cryosurgery procedure note:    Verbal consent from the patient is obtained including, but not limited to, risk of hypopigmentation/hyperpigmentation, scar, recurrence of lesion. Liquid nitrogen cryosurgery is applied to 2 verruca with prior paring, as detailed in the physical exam, to produce a freeze injury. 2 consecutive freeze thaw cycles are applied to each verruca. The patient is aware that blisters (possibly blood blisters) may form.    Discussed doing at-home wart treatment in-between if she would like to.         Follow up in about 3 weeks (around 10/9/2024).

## 2024-09-18 NOTE — PATIENT INSTRUCTIONS
At-home Wart treatment:     Recommend home maintenance for wart(s). After nightly soaking (warm water in bath or shower), patient should apply over-the-counter salicylic acid wart patch and cover. Remove in a.m. (or 12 hours later). Repeat nightly except night prior to next appointment.      Do the above treatment on the warts that were treated today.    Avoid picking, scratching, or biting the warts as this may cause them to spread.     CRYOSURGERY      Your doctor has used a method called cryosurgery to treat your skin condition. Cryosurgery refers to the use of very cold substances to treat a variety of skin conditions such as warts, pre-skin cancers, molluscum contagiosum, sun spots, and several benign growths. The substance we use in cryosurgery is liquid nitrogen and is so cold (-195 degrees Celsius) that is burns when administered.     Following treatment in the office, the skin may immediately burn and become red. You may find the area around the lesion is affected as well. It is sometimes necessary to treat not only the lesion, but a small area of the surrounding normal skin to achieve a good response.     A blister, and even a blood filled blister, may form after treatment.   This is a normal response. If the blister is painful, it is acceptable to sterilize a needle and with rubbing alcohol and gently pop the blister. It is important that you gently wash the area with soap and warm water as the blister fluid may contain wart virus if a wart was treated. Do no remove the roof of the blister.     The area treated can take anywhere from 1-3 weeks to heal. Healing time depends on the kind skin lesion treated, the location, and how aggressively the lesion was treated. It is recommended that the areas treated are covered with Vaseline or bacitracin ointment and a band-aid. If a band-aid is not practical, just ointment applied several times per day will do. Keeping these areas moist will speed the healing  time.    Treatment with liquid nitrogen can leave a scar. In dark skin, it may be a light or dark scar, in light skin it may be a white or pink scar. These will generally fade with time, but may never go away completely.     If you have any concerns after your treatment, please feel free to call the office.       [facility] [place] [address]/ [phone] FAX/ [email or web address]

## 2024-09-26 ENCOUNTER — TELEPHONE (OUTPATIENT)
Dept: ENDOSCOPY | Facility: HOSPITAL | Age: 39
End: 2024-09-26
Payer: OTHER GOVERNMENT

## 2024-09-26 NOTE — TELEPHONE ENCOUNTER
EGD Procedure Prep Instructions      Date of procedure: 10/28/24 Arrive at: 12:45PM      Location of Department: [address] [place], [place] [address] [place] [address]  Take the Elevators to 2nd Floor Endoscopy Procedural Area    How to prep:    Day Before Procedure: 10/27/24     You may have a light evening meal.   No solid food after 7:00 pm.   Continue drinking clear liquids.       Day of the Procedure:  10/28/24     You may have water/clear liquids until 4 hours before your procedure or as directed by the scheduling nurse  9:45AM. See below for list.    What You CANNOT do:   Do not drink milk or anything colored red.  Do not drink alcohol.  No gum chewing or candy morning of procedure.    Liquids That Are OK to Drink:   Water  Sports drinks (Gatorade, Power-Aid)  Coffee or tea (no cream or nondairy creamer)  Clear juices without pulp (apple, white grape)  Gelatin desserts (no fruit or toppings)  Clear soda (sprite, coke, ginger ale)  Chicken broth (until 12 midnight the night before procedure)      Comments:          IMPORTANT INFORMATION TO KNOW BEFORE YOUR PROCEDURE    [facility] 2nd Floor       When you arrive:  Enter at the rear of the building through the emergency department screening station or the Outpatient Registration door, then continue to endoscopy department on the 2nd floor.             If your procedure requires the administration of anesthesia, it is necessary for a responsible adult to drive you home. (Medical Transportation, Uber, Lyft, Taxi, etc. may ONLY be used if a responsible adult is present to accompany you home.  The responsible adult CAN'T be the  of the service).      person must be available to return to pick you up within 15 minutes of being notified of discharge.       Please bring a picture ID, insurance card, & copayment      Take Medications as directed below:        If you begin taking any blood thinning medications, injectable weight loss/diabetes  medications (other than insulin) , or Adipex (Phentermine) please contact the endoscopy scheduling department listed below as soon as possible.    If you are diabetic see the attached instruction sheet regarding your medication.     If you take HEART, BLOOD PRESSURE, SEIZURE, PAIN, LUNG (including inhalers/nebulizers), ANTI-REJECTION (transplant patients), or PSYCHIATRIC medications, please take at your regular times with a sip of water or as directed by the scheduling nurse.     Important contact information:    Endoscopy [phone] Hours of operation Monday-Friday 8:00-4:30pm.    Questions about insurance or financial obligations call [phone] or [phone].    If you have questions regarding the prep or need to reschedule, please call [phone]. After hours questions requiring immediate assistance, contact [facility] nurse line at [phone] or [phone].   NOTE:     On occasion, unforeseen circumstances may cause a delay in your procedure start time. We respect your time and appreciate your patience during these circumstances.      Comments:

## 2024-10-21 ENCOUNTER — TELEPHONE (OUTPATIENT)
Dept: ENDOSCOPY | Facility: HOSPITAL | Age: 39
End: 2024-10-21
Payer: OTHER GOVERNMENT

## 2024-10-21 ENCOUNTER — PATIENT MESSAGE (OUTPATIENT)
Dept: FAMILY MEDICINE | Facility: CLINIC | Age: 39
End: 2024-10-21
Payer: OTHER GOVERNMENT

## 2024-10-21 NOTE — TELEPHONE ENCOUNTER
Spoke to patient for pre-call to confirm scheduled Upper Endoscopy (EGD) and patient verbalized understanding of the following:       Date & arrival time of procedure(s) verified 10/28/24, 12:45 PM.  Location of procedure(s) [address]  verified.  NPO status reinforced. Ok to continue clear liquids until 11:45 AM.   Patient denies use of blood thinners, GLP-1 medications, and weight loss medications.  Patient confirmed receipt of prep instructions.  Instructions provided to patient via [facility].  Patient confirmed ride home after procedure if procedure requires anesthesia.   Pre-call screening questionnaire reviewed and completed with patient.   Appointment details are tentative, including check-in time.  If the patient begins taking any blood thinning medications, injectable weight loss/diabetes medications (other than insulin), or Adipex (phentermine) patient was instructed to contact the endoscopy scheduling department as soon as possible.  Patient was advised to call the endoscopy scheduling department if any questions or concerns arise.      Endoscopy Scheduling Department

## 2024-10-23 DIAGNOSIS — F41.9 ANXIETY DUE TO INVASIVE PROCEDURE: Primary | ICD-10-CM

## 2024-10-23 RX ORDER — LORAZEPAM 0.5 MG/1
0.5 TABLET ORAL
Qty: 1 TABLET | Refills: 0 | Status: SHIPPED | OUTPATIENT
Start: 2024-10-23 | End: 2024-11-22

## 2024-10-27 ENCOUNTER — ANESTHESIA EVENT (OUTPATIENT)
Dept: ENDOSCOPY | Facility: HOSPITAL | Age: 39
End: 2024-10-27
Payer: OTHER GOVERNMENT

## 2024-10-28 ENCOUNTER — HOSPITAL ENCOUNTER (OUTPATIENT)
Facility: HOSPITAL | Age: 39
Discharge: HOME | End: 2024-10-28
Attending: STUDENT IN AN ORGANIZED HEALTH CARE EDUCATION/TRAINING PROGRAM | Admitting: STUDENT IN AN ORGANIZED HEALTH CARE EDUCATION/TRAINING PROGRAM
Payer: OTHER GOVERNMENT

## 2024-10-28 ENCOUNTER — ANESTHESIA (OUTPATIENT)
Dept: ENDOSCOPY | Facility: HOSPITAL | Age: 39
End: 2024-10-28
Payer: OTHER GOVERNMENT

## 2024-10-28 VITALS
HEART RATE: 76 BPM | OXYGEN SATURATION: 100 % | DIASTOLIC BLOOD PRESSURE: 66 MMHG | RESPIRATION RATE: 20 BRPM | TEMPERATURE: 98 F | SYSTOLIC BLOOD PRESSURE: 107 MMHG

## 2024-10-28 DIAGNOSIS — R10.9 ABDOMINAL PAIN: ICD-10-CM

## 2024-10-28 LAB
B-HCG UR QL: NEGATIVE
CTP QC/QA: YES

## 2024-10-28 PROCEDURE — 63600175 PHARM REV CODE 636 W HCPCS: Performed by: NURSE ANESTHETIST, CERTIFIED REGISTERED

## 2024-10-28 PROCEDURE — 88305 TISSUE EXAM BY PATHOLOGIST: CPT | Performed by: PATHOLOGY

## 2024-10-28 PROCEDURE — 37000008 HC ANESTHESIA 1ST 15 MINUTES: Performed by: STUDENT IN AN ORGANIZED HEALTH CARE EDUCATION/TRAINING PROGRAM

## 2024-10-28 PROCEDURE — 43239 EGD BIOPSY SINGLE/MULTIPLE: CPT | Mod: ,,, | Performed by: STUDENT IN AN ORGANIZED HEALTH CARE EDUCATION/TRAINING PROGRAM

## 2024-10-28 PROCEDURE — 81025 URINE PREGNANCY TEST: CPT | Performed by: STUDENT IN AN ORGANIZED HEALTH CARE EDUCATION/TRAINING PROGRAM

## 2024-10-28 PROCEDURE — 27201012 HC FORCEPS, HOT/COLD, DISP: Performed by: STUDENT IN AN ORGANIZED HEALTH CARE EDUCATION/TRAINING PROGRAM

## 2024-10-28 PROCEDURE — 25000003 PHARM REV CODE 250: Performed by: NURSE ANESTHETIST, CERTIFIED REGISTERED

## 2024-10-28 PROCEDURE — 88305 TISSUE EXAM BY PATHOLOGIST: CPT | Mod: 26,,, | Performed by: PATHOLOGY

## 2024-10-28 PROCEDURE — 43239 EGD BIOPSY SINGLE/MULTIPLE: CPT | Performed by: STUDENT IN AN ORGANIZED HEALTH CARE EDUCATION/TRAINING PROGRAM

## 2024-10-28 PROCEDURE — 37000009 HC ANESTHESIA EA ADD 15 MINS: Performed by: STUDENT IN AN ORGANIZED HEALTH CARE EDUCATION/TRAINING PROGRAM

## 2024-10-28 RX ORDER — ONDANSETRON HYDROCHLORIDE 2 MG/ML
INJECTION, SOLUTION INTRAVENOUS
Status: DISCONTINUED | OUTPATIENT
Start: 2024-10-28 | End: 2024-10-28

## 2024-10-28 RX ORDER — PROPOFOL 10 MG/ML
VIAL (ML) INTRAVENOUS
Status: DISCONTINUED | OUTPATIENT
Start: 2024-10-28 | End: 2024-10-28

## 2024-10-28 RX ORDER — LIDOCAINE HYDROCHLORIDE 10 MG/ML
1 INJECTION, SOLUTION EPIDURAL; INFILTRATION; INTRACAUDAL; PERINEURAL ONCE
Status: DISCONTINUED | OUTPATIENT
Start: 2024-10-28 | End: 2024-10-28 | Stop reason: HOSPADM

## 2024-10-28 RX ORDER — GLYCOPYRROLATE 0.2 MG/ML
INJECTION INTRAMUSCULAR; INTRAVENOUS
Status: COMPLETED
Start: 2024-10-28 | End: 2024-10-28

## 2024-10-28 RX ORDER — PROPOFOL 10 MG/ML
VIAL (ML) INTRAVENOUS
Status: DISCONTINUED
Start: 2024-10-28 | End: 2024-10-28 | Stop reason: HOSPADM

## 2024-10-28 RX ORDER — PROPOFOL 10 MG/ML
VIAL (ML) INTRAVENOUS
Status: COMPLETED
Start: 2024-10-28 | End: 2024-10-28

## 2024-10-28 RX ORDER — LIDOCAINE HYDROCHLORIDE 20 MG/ML
INJECTION, SOLUTION EPIDURAL; INFILTRATION; INTRACAUDAL; PERINEURAL
Status: DISCONTINUED
Start: 2024-10-28 | End: 2024-10-28 | Stop reason: HOSPADM

## 2024-10-28 RX ORDER — LIDOCAINE HYDROCHLORIDE 20 MG/ML
INJECTION INTRAVENOUS
Status: DISCONTINUED | OUTPATIENT
Start: 2024-10-28 | End: 2024-10-28

## 2024-10-28 RX ORDER — ONDANSETRON HYDROCHLORIDE 2 MG/ML
INJECTION, SOLUTION INTRAVENOUS
Status: COMPLETED
Start: 2024-10-28 | End: 2024-10-28

## 2024-10-28 RX ORDER — LIDOCAINE HYDROCHLORIDE 40 MG/ML
SOLUTION TOPICAL
Status: DISCONTINUED | OUTPATIENT
Start: 2024-10-28 | End: 2024-10-28

## 2024-10-28 RX ADMIN — ONDANSETRON 4 MG: 2 INJECTION, SOLUTION INTRAMUSCULAR; INTRAVENOUS at 01:10

## 2024-10-28 RX ADMIN — PROPOFOL 70 MG: 10 INJECTION, EMULSION INTRAVENOUS at 01:10

## 2024-10-28 RX ADMIN — GLYCOPYRROLATE 0.1 MG: 0.2 INJECTION, SOLUTION INTRAMUSCULAR; INTRAVITREAL at 01:10

## 2024-10-28 RX ADMIN — LIDOCAINE HYDROCHLORIDE 100 MG: 20 INJECTION, SOLUTION INTRAVENOUS at 01:10

## 2024-10-28 RX ADMIN — LIDOCAINE HYDROCHLORIDE 3 ML: 40 SOLUTION TOPICAL at 01:10

## 2024-10-28 RX ADMIN — SODIUM CHLORIDE: 0.9 INJECTION, SOLUTION INTRAVENOUS at 01:10

## 2024-10-28 RX ADMIN — PROPOFOL 100 MG: 10 INJECTION, EMULSION INTRAVENOUS at 01:10

## 2024-10-30 LAB
FINAL PATHOLOGIC DIAGNOSIS: NORMAL
GROSS: NORMAL
Lab: NORMAL

## 2024-11-11 ENCOUNTER — OFFICE VISIT (OUTPATIENT)
Dept: OBSTETRICS AND GYNECOLOGY | Facility: CLINIC | Age: 39
End: 2024-11-11
Attending: OBSTETRICS & GYNECOLOGY
Payer: OTHER GOVERNMENT

## 2024-11-11 ENCOUNTER — PATIENT MESSAGE (OUTPATIENT)
Dept: GASTROENTEROLOGY | Facility: CLINIC | Age: 39
End: 2024-11-11
Payer: OTHER GOVERNMENT

## 2024-11-11 ENCOUNTER — TELEPHONE (OUTPATIENT)
Dept: GASTROENTEROLOGY | Facility: CLINIC | Age: 39
End: 2024-11-11
Payer: OTHER GOVERNMENT

## 2024-11-11 VITALS
DIASTOLIC BLOOD PRESSURE: 88 MMHG | WEIGHT: 146.81 LBS | BODY MASS INDEX: 23.59 KG/M2 | HEIGHT: 66 IN | SYSTOLIC BLOOD PRESSURE: 122 MMHG

## 2024-11-11 DIAGNOSIS — Z30.011 ENCOUNTER FOR INITIAL PRESCRIPTION OF CONTRACEPTIVE PILLS: Primary | ICD-10-CM

## 2024-11-11 DIAGNOSIS — Z30.430 ENCOUNTER FOR IUD INSERTION: ICD-10-CM

## 2024-11-11 PROCEDURE — 99213 OFFICE O/P EST LOW 20 MIN: CPT | Mod: PBBFAC,PO | Performed by: OBSTETRICS & GYNECOLOGY

## 2024-11-11 PROCEDURE — 99499 UNLISTED E&M SERVICE: CPT | Mod: S$PBB,,, | Performed by: OBSTETRICS & GYNECOLOGY

## 2024-11-11 PROCEDURE — 58300 INSERT INTRAUTERINE DEVICE: CPT | Mod: PBBFAC,PO | Performed by: OBSTETRICS & GYNECOLOGY

## 2024-11-11 PROCEDURE — 99999 PR PBB SHADOW E&M-EST. PATIENT-LVL III: CPT | Mod: PBBFAC,,, | Performed by: OBSTETRICS & GYNECOLOGY

## 2024-11-11 PROCEDURE — 99999PBSHW PR PBB SHADOW TECHNICAL ONLY FILED TO HB: Mod: PBBFAC,,,

## 2024-11-11 RX ADMIN — LEVONORGESTREL 1 INTRA UTERINE DEVICE: 52 INTRAUTERINE DEVICE INTRAUTERINE at 09:11

## 2024-11-11 NOTE — TELEPHONE ENCOUNTER
Patient sent a portal message in regards to her biopsy results. MA called/spoke with patient. Reviewed her results and provider recommendations. Patient verbalized understanding and had no other questions at this time.          Edmond Arenas MD  10/31/2024 12:09 PM CDT Back to Top   Dear MsNichol Levar:     It was my pleasure seeing you at the time of your recent EGD at [facility].     The biopsies from your stomach showed gastritis. Gastritis is inflammation in the soft mucous lining of your stomach. It's very common. You tested negative for H. Pylori infection which is the most common cause. The other common causes are alcohol, tobacco, and NSAID medications such as ibuprofen. I recommend avoiding these potential irritants.     I will forward a copy of this letter to your primary care/referring physician. Please do not hesitate to contact me if you have any questions regarding this letter.     Sincerely,     Edmond Arenas MD

## 2024-12-23 ENCOUNTER — OFFICE VISIT (OUTPATIENT)
Dept: OBSTETRICS AND GYNECOLOGY | Facility: CLINIC | Age: 39
End: 2024-12-23
Attending: OBSTETRICS & GYNECOLOGY
Payer: OTHER GOVERNMENT

## 2024-12-23 VITALS
WEIGHT: 144.88 LBS | BODY MASS INDEX: 23.29 KG/M2 | SYSTOLIC BLOOD PRESSURE: 118 MMHG | DIASTOLIC BLOOD PRESSURE: 84 MMHG | HEIGHT: 66 IN

## 2024-12-23 DIAGNOSIS — Z30.431 IUD CHECK UP: Primary | ICD-10-CM

## 2024-12-23 PROBLEM — D62 ACUTE BLOOD LOSS ANEMIA: Status: RESOLVED | Noted: 2024-03-20 | Resolved: 2024-12-23

## 2024-12-23 PROBLEM — O09.523 MULTIGRAVIDA OF ADVANCED MATERNAL AGE IN THIRD TRIMESTER: Status: RESOLVED | Noted: 2024-03-20 | Resolved: 2024-12-23

## 2024-12-23 PROBLEM — Z98.891 S/P CESAREAN SECTION: Status: RESOLVED | Noted: 2024-03-22 | Resolved: 2024-12-23

## 2024-12-23 PROBLEM — O34.219 HISTORY OF CESAREAN DELIVERY, CURRENTLY PREGNANT: Status: RESOLVED | Noted: 2023-09-15 | Resolved: 2024-12-23

## 2024-12-23 PROBLEM — Z34.90 PREGNANCY WITH ONE FETUS, ANTEPARTUM: Status: RESOLVED | Noted: 2023-09-15 | Resolved: 2024-12-23

## 2024-12-23 PROCEDURE — 99213 OFFICE O/P EST LOW 20 MIN: CPT | Mod: S$PBB,,, | Performed by: OBSTETRICS & GYNECOLOGY

## 2024-12-23 PROCEDURE — 99999 PR PBB SHADOW E&M-EST. PATIENT-LVL III: CPT | Mod: PBBFAC,,, | Performed by: OBSTETRICS & GYNECOLOGY

## 2024-12-23 PROCEDURE — 99213 OFFICE O/P EST LOW 20 MIN: CPT | Mod: PBBFAC,PO | Performed by: OBSTETRICS & GYNECOLOGY

## 2024-12-23 NOTE — PROGRESS NOTES
CC: IUD check    Bety Cristobal is a 39 y.o. female  presents for IUD check.  Reports she has minimal period, likes the device, partner can feel strings with intercourse, she is interested in having strings trimmed.    Patient had a Mirena placed by me on 2024.  She is not having problems with bleeding, cramping, fever or discharge.  She is able to feel the strings.      PHYSICAL EXAM:  Abdomen:  Soft, non-tender, non-distended  Vulva:  No lesions  Vaginal:  No lesions, no abnormal discharge  Cervix: No discharge, no CMT, IUD strings visible at os, trimmed to 1 cm  Uterus:  Normal size, non-tender  Adnexa:  No masses, non-tender    ASSESSMENT AND PLAN:  1. IUD surveillance    Patient counseled on IUD danger signs and how to check the strings reviewed.    Return for annual GYN exam

## 2024-12-23 NOTE — PROCEDURES
Insertion of IUD    Date/Time: 11/11/2024 9:45 AM    Performed by: Vika Brooke DO  Authorized by: Vika Brooke DO    Consent:     Consent obtained:  Prior to procedure the appropriate consent was completed and verified    Consent given by:  Patient    Procedure risks and benefits discussed: yes      Patient questions answered: yes      Patient agrees, verbalizes understanding, and wants to proceed: yes     Device to be inserted was verified by patient: yes    Educational handouts given: yes      Instructions and paperwork completed: yes    Insertion Procedure:   1 Intra Uterine Device levonorgestreL 52 mg       Negative GC/chlamydia test: yes      Negative urine pregnancy test: yes      Cervix cleaned and prepped: yes      Speculum placed in vagina: yes      Uterus sounded: yes      IUD inserted with no complications: yes      IUD type:  Mirena    Strings trimmed: yes    Post-procedure:     Patient tolerated procedure well: yes      Patient will follow up after next period: yes

## 2025-01-14 NOTE — PROGRESS NOTES
CC: Well woman exam    Bety Cristobal is a 39 y.o. female  presents for well woman exam.  LMP: Patient's last menstrual period was 2024 (exact date)..  No issues, problems, or complaints.      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     Kidney stones      Past Surgical History:   Procedure Laterality Date     SECTION       SECTION N/A 3/19/2024    Procedure:  SECTION;  Surgeon: Vika Brooke DO;  Location: [facility] L&D;  Service: OB/GYN;  Laterality: N/A;    ESOPHAGOGASTRODUODENOSCOPY N/A 10/28/2024    Procedure: EGD (ESOPHAGOGASTRODUODENOSCOPY);  Surgeon: Edmond Arenas MD;  Location: [facility];  Service: Endoscopy;  Laterality: N/A;  referred by  no scheduling concerns, instructions to pt portal.cf  24- pc complete. DBM   pt r/s (elvira) instructions to pt portal.cf  10/21/24- pc complete. DBM     Social History     Socioeconomic History    Marital status:     Number of children: 1   Occupational History    Occupation:      Comment: planner, works at [employer]   Tobacco Use    Smoking status: Never    Smokeless tobacco: Never   Substance and Sexual Activity    Alcohol use: Yes     Alcohol/week: 1.0 standard drink of alcohol     Types: 1 Glasses of wine per week     Comment: socially    Drug use: Never    Sexual activity: Yes     Partners: Male     Birth control/protection: OCP   Social History Narrative    [employer] son 7 months.    [employer].    Dairy allergy.     Social Drivers of Health     Financial Resource Strain: Low Risk  (2024)    Overall Financial Resource Strain (CARDIA)     Difficulty of Paying Living Expenses: Not very hard   Food Insecurity: No Food Insecurity (2024)    Hunger Vital Sign     Worried About Running Out of Food in the Last Year: Never true     Ran Out of Food in the Last Year: Never true   Physical Activity: Sufficiently Active  "(2024)    Exercise Vital Sign     Days of Exercise per Week: 5 days     Minutes of Exercise per Session: 60 min   Stress: No Stress Concern Present (2024)    [employer] [place] [employer] - Occupational Stress Questionnaire     Feeling of Stress : Only a little   Housing Stability: Unknown (2024)    Housing Stability Vital Sign     Unable to Pay for Housing in the Last Year: No     Family History   Problem Relation Name Age of Onset    Hypertension Mother      Coronary artery disease Mother          stent    Kidney disease Father          kidney stones    No Known Problems Sister      Breast cancer Neg Hx      Ovarian cancer Neg Hx      Colon cancer Neg Hx       OB History          2    Para   2    Term   2            AB        Living   2         SAB        IAB        Ectopic        Multiple   0    Live Births   2                 /86   Ht 5' 6" (1.676 m)   Wt 69.2 kg (152 lb 8.9 oz)   LMP 2024 (Exact Date)   Breastfeeding No   BMI 24.62 kg/m²       ROS:  GENERAL: Denies weight gain or weight loss. Feeling well overall.   SKIN: Denies rash or lesions.   HEAD: Denies head injury or headache.   NODES: Denies enlarged lymph nodes.   CHEST: Denies chest pain or shortness of breath.   CARDIOVASCULAR: Denies palpitations or left sided chest pain.   ABDOMEN: No abdominal pain, constipation, diarrhea, nausea, vomiting or rectal bleeding.   URINARY: No frequency, dysuria, hematuria, or burning on urination.  REPRODUCTIVE: See HPI.   BREASTS: The patient performs breast self-examination and denies pain, lumps, or nipple discharge.   HEMATOLOGIC: No easy bruisability or excessive bleeding.   MUSCULOSKELETAL: Denies joint pain or swelling.   NEUROLOGIC: Denies syncope or weakness.   PSYCHIATRIC: Denies depression, anxiety or mood swings.    PHYSICAL EXAM:  APPEARANCE: Well nourished, well developed, in no acute distress.  AFFECT: WNL, alert and oriented x 3  SKIN: No acne " or hirsutism  NECK: Neck symmetric without masses or thyromegaly  NODES: No inguinal, cervical, axillary, or femoral lymph node enlargement  CHEST: Good respiratory effect  ABDOMEN: Soft.  No tenderness or masses.  No hepatosplenomegaly.  No hernias.  BREASTS: Symmetrical, no skin changes or visible lesions.  No palpable masses, nipple discharge bilaterally.  PELVIC: Normal external genitalia without lesions.  Normal hair distribution.  Adequate perineal body, normal urethral meatus.  Vagina moist and well rugated without lesions or discharge.  Cervix pink, without lesions, discharge or tenderness.  No significant cystocele or rectocele.  Bimanual exam shows uterus to be normal size, regular, mobile and nontender.  Adnexa without masses or tenderness.    EXTREMITIES: No edema.    Well woman exam with routine gynecological exam  -     Ambulatory referral/consult to Ophthalmology; Future; Expected date: 01/13/2025    Encounter for initial prescription of contraceptive pills  -     Device Authorization Order            Patient was counseled today on A.C.S. Pap guidelines and recommendations for yearly pelvic exams, mammograms and monthly self breast exams; to see her PCP for other health maintenance.     No follow-ups on file.

## 2025-03-10 ENCOUNTER — PATIENT MESSAGE (OUTPATIENT)
Dept: OBSTETRICS AND GYNECOLOGY | Facility: CLINIC | Age: 40
End: 2025-03-10
Payer: OTHER GOVERNMENT

## 2025-05-06 ENCOUNTER — OFFICE VISIT (OUTPATIENT)
Dept: OPTOMETRY | Facility: CLINIC | Age: 40
End: 2025-05-06
Payer: OTHER GOVERNMENT

## 2025-05-06 DIAGNOSIS — Z01.419 WELL WOMAN EXAM WITH ROUTINE GYNECOLOGICAL EXAM: ICD-10-CM

## 2025-05-06 DIAGNOSIS — H16.223 KERATOCONJUNCTIVITIS SICCA OF BOTH EYES NOT SPECIFIED AS SJOGREN'S: ICD-10-CM

## 2025-05-06 DIAGNOSIS — Z01.00 ROUTINE EYE EXAM: Primary | ICD-10-CM

## 2025-05-06 PROCEDURE — 99999 PR PBB SHADOW E&M-EST. PATIENT-LVL III: CPT | Mod: PBBFAC,,, | Performed by: OPTOMETRIST

## 2025-05-06 PROCEDURE — 99213 OFFICE O/P EST LOW 20 MIN: CPT | Mod: PBBFAC,PO | Performed by: OPTOMETRIST

## 2025-05-06 PROCEDURE — 92015 DETERMINE REFRACTIVE STATE: CPT | Mod: ,,, | Performed by: OPTOMETRIST

## 2025-05-06 PROCEDURE — 92004 COMPRE OPH EXAM NEW PT 1/>: CPT | Mod: S$PBB,,, | Performed by: OPTOMETRIST

## 2025-05-06 NOTE — PROGRESS NOTES
HPI    40 Y/o female is here for Ucare itchy, red eye's possible Pterygium, pt   say's she has been having itching and small bumps on OU lower eyelids, pt   say's she has tried a number of different eye drops bu they were   uncomfortable and burned.  Pt denies pain and discomfort   No f/f    Eye med: OTC AT'S PRN   Last edited by Que Simmons MA on 5/6/2025  7:44 AM.            Assessment /Plan     For exam results, see Encounter Report.    Routine eye exam    Well woman exam with routine gynecological exam  -     Ambulatory referral/consult to Ophthalmology    Keratoconjunctivitis sicca of both eyes not specified as Sjogren's      Early hyp/pres sx--otc +1.00 OK  MGD/MAURI  Small pterygium OS--discussed sunglasses    PLAN:    SYSTANE COMPLETE Ats QID  PAMELA HEAT MASK 10 min nightly  OCUSOFT eyelid cleanser daily  Pt will call if sx persist w tx, otherwise rtc 1 yr

## 2025-05-12 PROBLEM — M25.519 SHOULDER PAIN: Status: RESOLVED | Noted: 2023-03-29 | Resolved: 2025-05-12

## 2025-05-12 PROBLEM — R10.13 EPIGASTRIC PAIN: Status: RESOLVED | Noted: 2024-07-03 | Resolved: 2025-05-12

## 2025-05-12 NOTE — PROGRESS NOTES
"FAMILY MEDICINE  [facility]  [place]    Reason for visit:   Chief Complaint   Patient presents with    Annual Exam    Establish Care         SUBJECTIVE: Bety Cristobal is a 40 y.o. female  - with recurrent  kidney stones  presents as a new patient to establish care and for her routine annual physical. Last PCP Dr. Zepeda    Gynecology Dr. Brooke, Vika MARIN, DO Albert is a 39-year-old woman who had a baby 1 year ago and reports several ongoing health concerns. She had an IUD placed in November, approximately 6 months ago, and has irregular periods, acne, and concerns about the IUD during intercourse. Her periods are light flow, occurring for a few days and then again 2 weeks later. The most recent period was dark red for 2 days, requiring a tampon. She has acne, particularly on her back, comparable to her adolescent years. Her partner occasionally feels the IUD during intercourse, causing her anxiety. She had a follow-up visit where the IUD strings were cut, which has improved the situation somewhat. She is considering getting it removed and has plans to discuss with her Gynecologist     She has a history of kidney stones but has not had an episode in about 5 years. She has suffered from recurrent kidney stones since high school. She takes potassium citrate for this condition but admits she is not as regimented in taking it as she used to be. She is establishing care with a new urologist and has an appointment scheduled for June. She has not had any issues since starting potassium citrate.  Her last renal ultrasound was 11/02/2023 that showed "Nonobstructing stone within the superior pole measuring 4 mm"    She recently started exercising again about 3 weeks ago, doing 30 minutes of cardio and weight training. She acknowledges struggles with maintaining a healthy diet due to the demands of caring for young children.    Bety is on potassium citrate for kidney stones. She has an IUD (Mirena) " inserted in November for birth control, replacing her previous birth control pills.    Family history is significant for mother with heart disease, stent(s), and blood pressure issues. Her maternal grandfather had a heart attack at a young age and heart issues. Bety's father and paternal aunt both have a history of kidney stones.          Review of Systems   All other systems reviewed and are negative.      HEALTH MAINTENANCE:   Health Maintenance   Topic Date Due    COVID-19 Vaccine (3 - 2024-25 season) 2024    Influenza Vaccine (Season Ended) 2025    Cervical Cancer Screening  2028    TETANUS VACCINE  2034    RSV Vaccine (Age 60+ and Pregnant patients) (1 - 1-dose 75+ series) 2062    Hepatitis C Screening  Completed    HIV Screening  Completed    Lipid Panel  Completed    Pneumococcal Vaccines (Age 0-49)  Aged Out     Health Maintenance Topics with due status: Not Due       Topic Last Completion Date    Cervical Cancer Screening 2023    Influenza Vaccine 2023    RSV Vaccine (Age 60+ and Pregnant patients) 2024    TETANUS VACCINE 2024     Health Maintenance Due   Topic Date Due    COVID-19 Vaccine (3 - 2024-25 season) 2024       HISTORY:   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     Kidney stones        Past Surgical History:   Procedure Laterality Date     SECTION       SECTION N/A 3/19/2024    Procedure:  SECTION;  Surgeon: Vika Brooke DO;  Location: [facility], [place], operated by [facility];  Service: OB/GYN;  Laterality: N/A;    ESOPHAGOGASTRODUODENOSCOPY N/A 10/28/2024    Procedure: EGD (ESOPHAGOGASTRODUODENOSCOPY);  Surgeon: Edmond Arenas MD;  Location: [facility];  Service: Endoscopy;  Laterality: N/A;  referred by  no scheduling concerns, instructions to pt portal.cf  24- pc complete. DB   pt r/s (elvira) instructions to pt portal.cf  10/21/24- pc complete. DBM       Family  "History   Problem Relation Name Age of Onset    Hyperlipidemia Mother      Hypertension Mother      Coronary artery disease Mother          stent    Heart disease Mother      Kidney disease Father          kidney stones    Nephrolithiasis Father      No Known Problems Sister      Heart disease Maternal Grandfather      Nephrolithiasis Paternal Grandfather      Breast cancer Neg Hx      Ovarian cancer Neg Hx      Colon cancer Neg Hx         Social History[1]    Social History     Social History Narrative    . 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 Regular exercise       ALLERGIES:   Review of patient's allergies indicates:   Allergen Reactions    Doxycycline Anaphylaxis     esopahgeal spasms       MEDS:   Current Outpatient Medications on File Prior to Visit   Medication Sig Dispense Refill Last Dose/Taking    dicyclomine (BENTYL) 10 MG capsule Take 10 mg by mouth 4 (four) times daily.       ondansetron (ZOFRAN) 4 MG tablet Take 1 tablet (4 mg total) by mouth every 8 (eight) hours as needed for Nausea. 30 tablet 0     potassium citrate (UROCIT-K 15) 15 mEq TbSR Take 1 tablet by mouth once daily. 90 tablet 3     [DISCONTINUED] norgestimate-ethinyl estradioL (TRI-LO-SPRINTEC) 0.18/0.215/0.25 mg-25 mcg tablet Take 1 tablet by mouth once daily. 84 tablet 3          Vital signs:   Vitals:    05/15/25 0943   BP: 114/78   Pulse: 85   Resp: 18   Weight: 68.4 kg (150 lb 12.7 oz)   Height: 5' 6" (1.676 m)     Body mass index is 24.34 kg/m².    PHYSICAL EXAM:     Physical Exam  Vitals reviewed.   Constitutional:       General: She is not in acute distress.  HENT:      Head: Normocephalic and atraumatic.      Right Ear: Tympanic membrane and ear canal normal.      Left Ear: Tympanic membrane and ear canal normal.   Eyes:      General: No scleral icterus.     Conjunctiva/sclera: Conjunctivae normal.   Neck:      Thyroid: No thyromegaly.      Vascular: No carotid bruit.   Cardiovascular:      Rate and " Rhythm: Normal rate and regular rhythm.      Heart sounds: Normal heart sounds. No murmur heard.     No friction rub. No gallop.   Pulmonary:      Effort: Pulmonary effort is normal.      Breath sounds: Normal breath sounds. No wheezing, rhonchi or rales.   Abdominal:      General: Bowel sounds are normal. There is no distension.      Palpations: Abdomen is soft.      Tenderness: There is no abdominal tenderness. There is no right CVA tenderness or left CVA tenderness.   Musculoskeletal:      Cervical back: Normal range of motion and neck supple.      Right lower leg: No edema.      Left lower leg: No edema.   Lymphadenopathy:      Cervical: No cervical adenopathy.   Skin:     General: Skin is warm.      Capillary Refill: Capillary refill takes less than 2 seconds.   Neurological:      Mental Status: She is alert.             PERTINENT RESULTS:   Lab Results   Component Value Date    WBC 9.58 07/03/2024    HGB 12.3 07/03/2024    HCT 39.0 07/03/2024    MCV 88 07/03/2024     07/03/2024       CMP  Sodium   Date Value Ref Range Status   07/03/2024 138 136 - 145 mmol/L Final     Potassium   Date Value Ref Range Status   07/03/2024 4.0 3.5 - 5.1 mmol/L Final     Chloride   Date Value Ref Range Status   07/03/2024 106 95 - 110 mmol/L Final     CO2   Date Value Ref Range Status   07/03/2024 24 23 - 29 mmol/L Final     Glucose   Date Value Ref Range Status   07/03/2024 88 70 - 110 mg/dL Final     BUN   Date Value Ref Range Status   07/03/2024 8 6 - 20 mg/dL Final     Creatinine   Date Value Ref Range Status   07/03/2024 0.8 0.5 - 1.4 mg/dL Final     Calcium   Date Value Ref Range Status   07/03/2024 9.6 8.7 - 10.5 mg/dL Final     Total Protein   Date Value Ref Range Status   07/03/2024 7.5 6.0 - 8.4 g/dL Final     Albumin   Date Value Ref Range Status   07/03/2024 3.6 3.5 - 5.2 g/dL Final     Total Bilirubin   Date Value Ref Range Status   07/03/2024 0.2 0.1 - 1.0 mg/dL Final     Comment:     For infants and newborns,  interpretation of results should be based  on gestational age, weight and in agreement with clinical  observations.    Premature Infant recommended reference ranges:  Up to 24 hours.............<8.0 mg/dL  Up to 48 hours............<12.0 mg/dL  3-5 days..................<15.0 mg/dL  6-29 days.................<15.0 mg/dL       Alkaline Phosphatase   Date Value Ref Range Status   07/03/2024 37 (L) 55 - 135 U/L Final     AST   Date Value Ref Range Status   07/03/2024 22 10 - 40 U/L Final     ALT   Date Value Ref Range Status   07/03/2024 21 10 - 44 U/L Final     Anion Gap   Date Value Ref Range Status   07/03/2024 8 8 - 16 mmol/L Final     eGFR   Date Value Ref Range Status   07/03/2024 >60.0 >60 mL/min/1.73 m^2 Final     Lab Results   Component Value Date    CHOL 194 07/03/2024    CHOL 160 10/06/2022    CHOL 181 06/21/2021      Lab Results   Component Value Date    HDL 57 07/03/2024    HDL 57 10/06/2022    HDL 47 06/21/2021     Lab Results   Component Value Date    LDLCALC 114.8 07/03/2024    LDLCALC 76.8 10/06/2022    LDLCALC 114.8 06/21/2021      Lab Results   Component Value Date    TRIG 111 07/03/2024    TRIG 131 10/06/2022    TRIG 96 06/21/2021     Lab Results   Component Value Date    TOTALCHOLEST 3.4 07/03/2024    TOTALCHOLEST 2.8 10/06/2022    TOTALCHOLEST 3.9 06/21/2021     Lab Results   Component Value Date    NONHDLCHOL 137 07/03/2024    NONHDLCHOL 103 10/06/2022    NONHDLCHOL 134 06/21/2021     Lab Results   Component Value Date    CHOLHDL 29.4 07/03/2024    CHOLHDL 35.6 10/06/2022    CHOLHDL 26.0 06/21/2021     Lab Results   Component Value Date    HGBA1C 5.0 10/06/2022     Lab Results   Component Value Date    TSH 1.661 06/19/2023    FREET4 0.82 06/19/2023     Details    Reading Physician Reading Date Result Priority   Medardo Winslow MD  [phone]  11/2/2023      Narrative & Impression  EXAMINATION:  US RETROPERITONEAL COMPLETE; US BLADDER     CLINICAL HISTORY:  check PVR;; PVR; Calculus of  kidney     TECHNIQUE:  Ultrasound of the kidneys and urinary bladder was performed including color flow and Doppler evaluation of the kidneys.     COMPARISON:  None.     FINDINGS:  Right kidney: The right kidney measures 12.2 cm. No cortical thinning. No loss of corticomedullary distinction. Resistive index measures 0.63.  Minimally complex cyst within the superior pole measuring 1.6 x 1.4 x 1.3 cm containing a thin septation.  Nonobstructing stone within the superior pole measuring 4 mm.  No hydronephrosis.     Left kidney: The left kidney measures 11.2 cm. No cortical thinning. No loss of corticomedullary distinction. Resistive index measures 0.73.  No mass. No renal stone. No hydronephrosis.     The bladder is partially distended at the time of scanning and has an unremarkable appearance.  Prevoid bladder volume measures 89.5 cc.  Postvoid bladder volume measures 14.3 cc.     Impression:     Small nonobstructing right renal stone.  No hydronephrosis.     Minimally complex right renal cyst.     Normal postvoid residual bladder volume.        Electronically signed by:Medardo Winslow  Date:                                            11/02/2023  Time:                                           10:25        Exam Ended: 11/02/23 10:20 CDT Last Resulted: 11/02/23 10:25 CDT       ASSESSMENT/PLAN:    1. Routine medical exam  Overview:  - preventative health counseling  - reviewed current recommendations for breast cancer screening.  Average risk Toshia: No Score  - reviewed current recommendations for cervical cancer screening.  08/21/2023 Pap smear negative with negative HPV.  Repeat due 2028.  Followed by Gynecology  - reviewed current recommendations for colon cancer screening.  Average risk      Orders:  -     TSH; Future; Expected date: 05/15/2025  -     Lipid Panel; Future; Expected date: 05/15/2025  -     Hemoglobin A1C; Future; Expected date: 05/15/2025  -     Comprehensive Metabolic Panel; Future; Expected  date: 05/15/2025  -     CBC Auto Differential; Future; Expected date: 05/15/2025    2. Kidney stones- takes citrate, hydrates  Overview:  - 11/02/2023 renal ultrasound:Nonobstructing stone within the superior pole measuring 4 mm.   - well controlled with potassium citrate   -follows with Urology      3. Multiple renal cysts  Overview:  10/05/2022 renal ultrasound: Right mid renal pole simple cyst   11/02/2023 renal ultrasound: Right kidney: The right kidney measures 12.2 cm. No cortical thinning. No loss of corticomedullary distinction. Resistive index measures 0.63.  Minimally complex cyst within the superior pole measuring 1.6 x 1.4 x 1.3 cm containing a thin septation.  Lab Results   Component Value Date    CREATININE 0.8 07/03/2024                 ORDERS:   Orders Placed This Encounter    TSH    Lipid Panel    Hemoglobin A1C    Comprehensive Metabolic Panel    CBC Auto Differential       Vaccines recommended:  COVID-19    Follow up in about 1 year (around 5/15/2026) for Annual. or sooner with any concerns      This encounter was dictated and transcribed using DeepScribe and Flu[employer], please excuse any typographical or grammatical errors.    Dr. Merissa Smith D.O.   Family Medicine         [1]   Social History  Tobacco Use    Smoking status: Never    Smokeless tobacco: Never   Substance Use Topics    Alcohol use: Yes     Alcohol/week: 1.0 standard drink of alcohol     Types: 1 Glasses of wine per week     Comment: socially    Drug use: Never

## 2025-05-15 ENCOUNTER — OFFICE VISIT (OUTPATIENT)
Dept: PRIMARY CARE CLINIC | Facility: CLINIC | Age: 40
End: 2025-05-15
Attending: FAMILY MEDICINE
Payer: OTHER GOVERNMENT

## 2025-05-15 VITALS
WEIGHT: 150.81 LBS | HEART RATE: 85 BPM | DIASTOLIC BLOOD PRESSURE: 78 MMHG | BODY MASS INDEX: 24.24 KG/M2 | HEIGHT: 66 IN | SYSTOLIC BLOOD PRESSURE: 114 MMHG | RESPIRATION RATE: 18 BRPM

## 2025-05-15 DIAGNOSIS — N20.0 KIDNEY STONES: ICD-10-CM

## 2025-05-15 DIAGNOSIS — Z00.00 ROUTINE MEDICAL EXAM: Primary | ICD-10-CM

## 2025-05-15 DIAGNOSIS — Q61.02 MULTIPLE RENAL CYSTS: ICD-10-CM

## 2025-05-15 PROCEDURE — 99999 PR PBB SHADOW E&M-EST. PATIENT-LVL III: CPT | Mod: PBBFAC,,, | Performed by: FAMILY MEDICINE

## 2025-05-15 PROCEDURE — 99213 OFFICE O/P EST LOW 20 MIN: CPT | Mod: PBBFAC,PN | Performed by: FAMILY MEDICINE

## 2025-05-16 ENCOUNTER — LAB VISIT (OUTPATIENT)
Dept: LAB | Facility: HOSPITAL | Age: 40
End: 2025-05-16
Attending: FAMILY MEDICINE
Payer: OTHER GOVERNMENT

## 2025-05-16 DIAGNOSIS — Z00.00 ROUTINE MEDICAL EXAM: ICD-10-CM

## 2025-05-16 LAB
ABSOLUTE EOSINOPHIL (OHS): 0.1 K/UL
ABSOLUTE MONOCYTE (OHS): 0.57 K/UL (ref 0.3–1)
ABSOLUTE NEUTROPHIL COUNT (OHS): 4.49 K/UL (ref 1.8–7.7)
ALBUMIN SERPL BCP-MCNC: 3.9 G/DL (ref 3.5–5.2)
ALP SERPL-CCNC: 34 UNIT/L (ref 40–150)
ALT SERPL W/O P-5'-P-CCNC: 10 UNIT/L (ref 10–44)
ANION GAP (OHS): 10 MMOL/L (ref 8–16)
AST SERPL-CCNC: 18 UNIT/L (ref 11–45)
BASOPHILS # BLD AUTO: 0.05 K/UL
BASOPHILS NFR BLD AUTO: 0.7 %
BILIRUB SERPL-MCNC: 0.6 MG/DL (ref 0.1–1)
BUN SERPL-MCNC: 11 MG/DL (ref 6–20)
CALCIUM SERPL-MCNC: 9.2 MG/DL (ref 8.7–10.5)
CHLORIDE SERPL-SCNC: 104 MMOL/L (ref 95–110)
CHOLEST SERPL-MCNC: 190 MG/DL (ref 120–199)
CHOLEST/HDLC SERPL: 3.5 {RATIO} (ref 2–5)
CO2 SERPL-SCNC: 24 MMOL/L (ref 23–29)
CREAT SERPL-MCNC: 0.7 MG/DL (ref 0.5–1.4)
EAG (OHS): 103 MG/DL (ref 68–131)
ERYTHROCYTE [DISTWIDTH] IN BLOOD BY AUTOMATED COUNT: 13.2 % (ref 11.5–14.5)
GFR SERPLBLD CREATININE-BSD FMLA CKD-EPI: >60 ML/MIN/1.73/M2
GLUCOSE SERPL-MCNC: 75 MG/DL (ref 70–110)
HBA1C MFR BLD: 5.2 % (ref 4–5.6)
HCT VFR BLD AUTO: 42.5 % (ref 37–48.5)
HDLC SERPL-MCNC: 55 MG/DL (ref 40–75)
HDLC SERPL: 28.9 % (ref 20–50)
HGB BLD-MCNC: 13.3 GM/DL (ref 12–16)
IMM GRANULOCYTES # BLD AUTO: 0.02 K/UL (ref 0–0.04)
IMM GRANULOCYTES NFR BLD AUTO: 0.3 % (ref 0–0.5)
LDLC SERPL CALC-MCNC: 119.6 MG/DL (ref 63–159)
LYMPHOCYTES # BLD AUTO: 2.38 K/UL (ref 1–4.8)
MCH RBC QN AUTO: 27.6 PG (ref 27–31)
MCHC RBC AUTO-ENTMCNC: 31.3 G/DL (ref 32–36)
MCV RBC AUTO: 88 FL (ref 82–98)
NONHDLC SERPL-MCNC: 135 MG/DL
NUCLEATED RBC (/100WBC) (OHS): 0 /100 WBC
PLATELET # BLD AUTO: 342 K/UL (ref 150–450)
PMV BLD AUTO: 10.7 FL (ref 9.2–12.9)
POTASSIUM SERPL-SCNC: 4.6 MMOL/L (ref 3.5–5.1)
PROT SERPL-MCNC: 7.3 GM/DL (ref 6–8.4)
RBC # BLD AUTO: 4.82 M/UL (ref 4–5.4)
RELATIVE EOSINOPHIL (OHS): 1.3 %
RELATIVE LYMPHOCYTE (OHS): 31.3 % (ref 18–48)
RELATIVE MONOCYTE (OHS): 7.5 % (ref 4–15)
RELATIVE NEUTROPHIL (OHS): 58.9 % (ref 38–73)
SODIUM SERPL-SCNC: 138 MMOL/L (ref 136–145)
TRIGL SERPL-MCNC: 77 MG/DL (ref 30–150)
TSH SERPL-ACNC: 1.52 UIU/ML (ref 0.4–4)
WBC # BLD AUTO: 7.61 K/UL (ref 3.9–12.7)

## 2025-05-16 PROCEDURE — 85025 COMPLETE CBC W/AUTO DIFF WBC: CPT

## 2025-05-16 PROCEDURE — 84443 ASSAY THYROID STIM HORMONE: CPT

## 2025-05-16 PROCEDURE — 36415 COLL VENOUS BLD VENIPUNCTURE: CPT | Mod: PO

## 2025-05-16 PROCEDURE — 80053 COMPREHEN METABOLIC PANEL: CPT

## 2025-05-16 PROCEDURE — 83036 HEMOGLOBIN GLYCOSYLATED A1C: CPT

## 2025-05-16 PROCEDURE — 80061 LIPID PANEL: CPT

## 2025-05-19 ENCOUNTER — RESULTS FOLLOW-UP (OUTPATIENT)
Dept: PRIMARY CARE CLINIC | Facility: CLINIC | Age: 40
End: 2025-05-19

## 2025-06-13 ENCOUNTER — PATIENT MESSAGE (OUTPATIENT)
Dept: OBSTETRICS AND GYNECOLOGY | Facility: CLINIC | Age: 40
End: 2025-06-13
Payer: OTHER GOVERNMENT

## 2025-06-17 ENCOUNTER — OFFICE VISIT (OUTPATIENT)
Dept: UROLOGY | Facility: CLINIC | Age: 40
End: 2025-06-17
Payer: OTHER GOVERNMENT

## 2025-06-17 VITALS
SYSTOLIC BLOOD PRESSURE: 121 MMHG | BODY MASS INDEX: 24.24 KG/M2 | HEART RATE: 85 BPM | WEIGHT: 150.81 LBS | HEIGHT: 66 IN | DIASTOLIC BLOOD PRESSURE: 80 MMHG

## 2025-06-17 DIAGNOSIS — Q61.02 MULTIPLE RENAL CYSTS: ICD-10-CM

## 2025-06-17 DIAGNOSIS — R82.81 PYURIA: ICD-10-CM

## 2025-06-17 DIAGNOSIS — N20.0 KIDNEY STONES: Primary | ICD-10-CM

## 2025-06-17 LAB
BACTERIA #/AREA URNS AUTO: ABNORMAL /HPF
MICROSCOPIC COMMENT: ABNORMAL
SQUAMOUS #/AREA URNS AUTO: <1 /HPF
WBC #/AREA URNS AUTO: 11 /HPF (ref 0–5)

## 2025-06-17 PROCEDURE — 87088 URINE BACTERIA CULTURE: CPT | Performed by: UROLOGY

## 2025-06-17 PROCEDURE — 99999 PR PBB SHADOW E&M-EST. PATIENT-LVL III: CPT | Mod: PBBFAC,,, | Performed by: UROLOGY

## 2025-06-17 PROCEDURE — 99213 OFFICE O/P EST LOW 20 MIN: CPT | Mod: PBBFAC | Performed by: UROLOGY

## 2025-06-17 PROCEDURE — 99213 OFFICE O/P EST LOW 20 MIN: CPT | Mod: S$PBB,,, | Performed by: UROLOGY

## 2025-06-17 PROCEDURE — 81001 URINALYSIS AUTO W/SCOPE: CPT | Performed by: UROLOGY

## 2025-06-17 NOTE — PROGRESS NOTES
"[facility] [address]   Clinic Note    SUBJECTIVE:     Chief Complaint: history of urolithiasis    History of Present Illness:  Bety Cristobal is a 40 y.o. female who presents to clinic for history or urolithiasis.     She has been asymptomatic, denies flank pain, hematuria. She has not had a stone event in years. She denies prior kidney stone surgeries. She has known residual stones from prior imaging. She takes uro cit K but not regularly. Her last 24 hr urine study was in 2018.     Family history of stones in dad and aunt.   Stones since childhood.   Used to live in [place]. Also saw Dr. Marroquin in the past.     Has two little children.       OBJECTIVE:     Estimated body mass index is 24.34 kg/m² as calculated from the following:    Height as of this encounter: 5' 6" (1.676 m).    Weight as of this encounter: 68.4 kg (150 lb 12.7 oz).    Vital Signs (Most Recent)  Pulse: 85 (06/17/25 0857)  BP: 121/80 (06/17/25 0857)    Physical Exam  Constitutional:       General: She is not in acute distress.  HENT:      Head: Normocephalic.   Cardiovascular:      Rate and Rhythm: Normal rate.   Pulmonary:      Effort: Pulmonary effort is normal.   Abdominal:      General: There is no distension.      Palpations: Abdomen is soft.      Tenderness: There is no abdominal tenderness. There is no right CVA tenderness or left CVA tenderness.   Musculoskeletal:         General: Normal range of motion.   Skin:     General: Skin is warm and dry.   Neurological:      Mental Status: She is alert.      Coordination: Coordination normal.   Psychiatric:         Behavior: Behavior normal.         Lab Results   Component Value Date    BUN 11 05/16/2025    CREATININE 0.7 05/16/2025    WBC 7.61 05/16/2025    HGB 13.3 05/16/2025    HCT 42.5 05/16/2025     05/16/2025    AST 18 05/16/2025    ALT 10 05/16/2025    ALKPHOS 34 (L) 05/16/2025    ALBUMIN 3.9 05/16/2025    HGBA1C 5.2 05/16/2025      Urine 1+ leuk  pH " 6.0    ASSESSMENT     1. Kidney stones- takes citrate, hydrates    2. Multiple renal cysts      PLAN:   1. Kidney stones- takes citrate, hydrates  Overview:  - 11/02/2023 renal ultrasound:Nonobstructing stone within the superior pole measuring 4 mm.   - well controlled with potassium citrate   -follows with Urology    Orders:  -     US Kidney; Future; Expected date: 06/17/2025  -     X-Ray Abdomen AP 1 View; Future; Expected date: 06/17/2025  -     Kidney Stone Urine Panel One, 24-hour collection; Future; Expected date: 06/17/2025    2. Multiple renal cysts  Overview:  10/05/2022 renal ultrasound: Right mid renal pole simple cyst   11/02/2023 renal ultrasound: Right kidney: The right kidney measures 12.2 cm. No cortical thinning. No loss of corticomedullary distinction. Resistive index measures 0.63.  Minimally complex cyst within the superior pole measuring 1.6 x 1.4 x 1.3 cm containing a thin septation.  Lab Results   Component Value Date    CREATININE 0.8 07/03/2024           3. Pyuria  -     Urinalysis Microscopic  -     Urine Culture High Risk ($$)     Ordering a 24 hr urine.   Kub and renal u/s   General risk factors for kidney stones and the conservative measures to prevent kidney stones in the future were discussed with the patient in detail.  The patient was encouraged to drink 2-3 liters of water a day, limit iced tea and kelsie as well as foods high in oxalate.  They were cautioned to try to limit salt and red meat intake.  We also discussed adding citrate to the diet with the addition of sundar or lemon juice to their water or alternatively with crystal light.       MD Dr. Sha IRBY saw and evaluated patient and agrees with treatment, evaluation and plan.

## 2025-06-19 ENCOUNTER — RESULTS FOLLOW-UP (OUTPATIENT)
Dept: UROLOGY | Facility: CLINIC | Age: 40
End: 2025-06-19
Payer: OTHER GOVERNMENT

## 2025-06-19 LAB — BACTERIA UR CULT: ABNORMAL

## 2025-06-19 RX ORDER — SULFAMETHOXAZOLE AND TRIMETHOPRIM 800; 160 MG/1; MG/1
1 TABLET ORAL 2 TIMES DAILY
Qty: 10 TABLET | Refills: 0 | Status: SHIPPED | OUTPATIENT
Start: 2025-06-19 | End: 2025-06-24

## 2025-07-02 ENCOUNTER — PATIENT MESSAGE (OUTPATIENT)
Dept: OBSTETRICS AND GYNECOLOGY | Facility: CLINIC | Age: 40
End: 2025-07-02
Payer: OTHER GOVERNMENT

## 2025-07-14 ENCOUNTER — OFFICE VISIT (OUTPATIENT)
Dept: OBSTETRICS AND GYNECOLOGY | Facility: CLINIC | Age: 40
End: 2025-07-14
Attending: OBSTETRICS & GYNECOLOGY
Payer: OTHER GOVERNMENT

## 2025-07-14 ENCOUNTER — HOSPITAL ENCOUNTER (OUTPATIENT)
Dept: RADIOLOGY | Facility: HOSPITAL | Age: 40
Discharge: HOME | End: 2025-07-14
Attending: UROLOGY
Payer: OTHER GOVERNMENT

## 2025-07-14 ENCOUNTER — TELEPHONE (OUTPATIENT)
Dept: OBSTETRICS AND GYNECOLOGY | Facility: CLINIC | Age: 40
End: 2025-07-14

## 2025-07-14 DIAGNOSIS — N20.0 KIDNEY STONES: ICD-10-CM

## 2025-07-14 DIAGNOSIS — L70.9 ACNE, UNSPECIFIED ACNE TYPE: ICD-10-CM

## 2025-07-14 DIAGNOSIS — Z97.5 IUD (INTRAUTERINE DEVICE) IN PLACE: ICD-10-CM

## 2025-07-14 DIAGNOSIS — N92.6 IRREGULAR BLEEDING: Primary | ICD-10-CM

## 2025-07-14 DIAGNOSIS — L98.8 SKIN LESION OF BREAST: ICD-10-CM

## 2025-07-14 PROCEDURE — 74018 RADEX ABDOMEN 1 VIEW: CPT | Mod: 26,,, | Performed by: INTERNAL MEDICINE

## 2025-07-14 PROCEDURE — 76770 US EXAM ABDO BACK WALL COMP: CPT | Mod: TC

## 2025-07-14 PROCEDURE — 74018 RADEX ABDOMEN 1 VIEW: CPT | Mod: TC,FY

## 2025-07-14 PROCEDURE — 76770 US EXAM ABDO BACK WALL COMP: CPT | Mod: 26,,, | Performed by: INTERNAL MEDICINE

## 2025-07-14 NOTE — PROGRESS NOTES
The patient location is: [place]  The chief complaint leading to consultation is: skin lesion of breast, issues with IUD    Visit type: audiovisual    Face to Face time with patient: 20   total minutes of total time spent on the encounter, which includes face to face time and non-face to face time preparing to see the patient (eg, review of tests), Obtaining and/or reviewing separately obtained history, Documenting clinical information in the electronic or other health record, Independently interpreting results (not separately reported) and communicating results to the patient/family/caregiver, or Care coordination (not separately reported).         Each patient to whom he or she provides medical services by telemedicine is:  (1) informed of the relationship between the physician and patient and the respective role of any other health care provider with respect to management of the patient; and (2) notified that he or she may decline to receive medical services by telemedicine and may withdraw from such care at any time.    Notes:   CC:    HPI:  Surface breast lesion- reddish skin lesion, no drainage or pain.    F/U on IUD- MIrena in place since 11/2024, partner has had a vasectomy, still has irregular periods, 10 days of bleeding in June, May had 2-3 days then stopped then another 2-3d.   -has taken pill in the past   - has noted increased hormonal acne      ROS:  GENERAL: Feeling well overall.  SKIN: skin lesion per above.  Has noted increased acne.  CARDIOVASCULAR: Denies palpitations or left sided chest pain.   ABDOMEN: No abdominal pain, constipation, diarrhea, nausea, vomiting or rectal bleeding.   URINARY: No dysuria, hematuria, or burning on urination.  REPRODUCTIVE: See HPI.   BREASTS: Denies pain, lumps, or nipple discharge. See HPI, has noted skin lesion      PE:   APPEARANCE: Well nourished, well developed,in no acute distress.        Diagnosis:  1. Irregular bleeding    2. IUD (intrauterine device) in  place    3. Acne, unspecified acne type    4. Skin lesion of breast        Plan:   Remove IUD  Give 3 months without anything, may consider OCP for cycle control PRN       Vika Brooke,

## 2025-07-14 NOTE — TELEPHONE ENCOUNTER
CALLED PT OFFERED SEVERAL APPOINTMENT PT COULD NOT TAKE DUE TO CHILDCARE. PT SCHEDULED FOR 2PM ON 8/18

## 2025-07-14 NOTE — TELEPHONE ENCOUNTER
----- Message from Vika Brooke, DO sent at 7/14/2025  1:16 PM CDT -----  Please call patient to schedule IUD removal and breast check, would like to schedule in next week.

## 2025-08-10 ENCOUNTER — PATIENT MESSAGE (OUTPATIENT)
Dept: DERMATOLOGY | Facility: CLINIC | Age: 40
End: 2025-08-10
Payer: OTHER GOVERNMENT

## 2025-08-18 ENCOUNTER — OFFICE VISIT (OUTPATIENT)
Dept: OBSTETRICS AND GYNECOLOGY | Facility: CLINIC | Age: 40
End: 2025-08-18
Attending: OBSTETRICS & GYNECOLOGY
Payer: OTHER GOVERNMENT

## 2025-08-18 VITALS
SYSTOLIC BLOOD PRESSURE: 119 MMHG | BODY MASS INDEX: 24.88 KG/M2 | HEIGHT: 66 IN | DIASTOLIC BLOOD PRESSURE: 79 MMHG | HEART RATE: 86 BPM | WEIGHT: 154.81 LBS

## 2025-08-18 DIAGNOSIS — Z30.432 ENCOUNTER FOR IUD REMOVAL: Primary | ICD-10-CM

## 2025-08-18 PROCEDURE — 99213 OFFICE O/P EST LOW 20 MIN: CPT | Mod: PBBFAC,PO,25 | Performed by: OBSTETRICS & GYNECOLOGY

## 2025-08-18 PROCEDURE — 99499 UNLISTED E&M SERVICE: CPT | Mod: S$PBB,,, | Performed by: OBSTETRICS & GYNECOLOGY

## 2025-08-18 PROCEDURE — 58301 REMOVE INTRAUTERINE DEVICE: CPT | Mod: PBBFAC,PO | Performed by: OBSTETRICS & GYNECOLOGY

## 2025-08-18 PROCEDURE — 99999 PR PBB SHADOW E&M-EST. PATIENT-LVL III: CPT | Mod: PBBFAC,,, | Performed by: OBSTETRICS & GYNECOLOGY

## 2025-08-26 ENCOUNTER — PATIENT MESSAGE (OUTPATIENT)
Dept: UROLOGY | Facility: CLINIC | Age: 40
End: 2025-08-26
Payer: OTHER GOVERNMENT

## 2025-08-27 ENCOUNTER — PATIENT MESSAGE (OUTPATIENT)
Dept: UROLOGY | Facility: CLINIC | Age: 40
End: 2025-08-27
Payer: OTHER GOVERNMENT